# Patient Record
Sex: FEMALE | Race: ASIAN | NOT HISPANIC OR LATINO | Employment: UNEMPLOYED | URBAN - METROPOLITAN AREA
[De-identification: names, ages, dates, MRNs, and addresses within clinical notes are randomized per-mention and may not be internally consistent; named-entity substitution may affect disease eponyms.]

---
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
## 2025-02-06 ENCOUNTER — ALLIED HEALTH/NURSE VISIT (OUTPATIENT)
Dept: FAMILY MEDICINE | Facility: CLINIC | Age: 20
End: 2025-02-06
Payer: MEDICAID

## 2025-02-06 DIAGNOSIS — Z23 ENCOUNTER FOR IMMUNIZATION: Primary | ICD-10-CM

## 2025-02-06 NOTE — PROGRESS NOTES
Prior to immunization administration, verified patients identity using patient s name and date of birth. Please see Immunization Activity for additional information.     Is the patient's temperature normal (100.5 or less)? Yes     Patient MEETS CRITERIA. PROCEED with vaccine administration.      Patient instructed to remain in clinic for 15 minutes afterwards, and to report any adverse reactions.      Link to Ancillary Visit Immunization Standing Orders SmartSet     Screening performed by Camila Christopher MA on 2/6/2025 at 10:19 AM.

## 2025-02-16 ENCOUNTER — HEALTH MAINTENANCE LETTER (OUTPATIENT)
Age: 20
End: 2025-02-16

## 2025-06-23 ENCOUNTER — VIRTUAL VISIT (OUTPATIENT)
Dept: OBGYN | Facility: CLINIC | Age: 20
End: 2025-06-23
Payer: MEDICAID

## 2025-06-23 DIAGNOSIS — Z34.80 PRENATAL CARE, SUBSEQUENT PREGNANCY, UNSPECIFIED TRIMESTER: Primary | ICD-10-CM

## 2025-06-23 PROBLEM — R93.89 ABNORMAL FINDING ON ULTRASOUND: Status: ACTIVE | Noted: 2024-11-20

## 2025-06-23 PROBLEM — O36.8990 UMBILICAL CORD CYST DURING PREGNANCY, ANTEPARTUM: Status: ACTIVE | Noted: 2024-11-12

## 2025-06-23 PROBLEM — O28.0 ABNORMAL MATERNAL SERUM SCREENING TEST: Status: ACTIVE | Noted: 2024-11-20

## 2025-06-23 PROBLEM — O09.619: Status: ACTIVE | Noted: 2024-11-12

## 2025-06-23 PROCEDURE — 99207 PR NO CHARGE NURSE ONLY: CPT | Mod: 93

## 2025-06-23 RX ORDER — NITROFURANTOIN 25; 75 MG/1; MG/1
CAPSULE ORAL
COMMUNITY
Start: 2024-11-15 | End: 2025-06-26

## 2025-06-23 RX ORDER — PRENATAL VIT 27,CALC/IRON/FA 60 MG-1 MG
1 TABLET ORAL
COMMUNITY
Start: 2024-09-19 | End: 2025-09-19

## 2025-06-23 NOTE — PATIENT INSTRUCTIONS
Learning About Pregnancy  Your Care Instructions     Your health in the early weeks of your pregnancy is particularly important for your baby's health. Take good care of yourself. Anything you do that harms your body can also harm your baby.  Make sure to go to all of your doctor appointments. Regular checkups will help keep you and your baby healthy.  How can you care for yourself at home?  Diet    Choose healthy foods like fruits, vegetables, whole grains, lean proteins, and healthy fats.     Choose foods that are good sources of calcium, iron, and folate. You can try dairy products, dark leafy greens, fortified orange juice and cereals, almonds, broccoli, dried fruit, and beans.     Do not skip meals or go for many hours without eating. If you are nauseated, try to eat a small, healthy snack every 2 to 3 hours.     Avoid fish that are high in mercury. These include shark, swordfish, adonay mackerel, marlin, orange roughy, and bigeye tuna, as well as tilefish from the [facility].     It's okay to eat up to 8 to 12 ounces a week of fish that are low in mercury or up to 4 ounces a week of fish that have medium levels of mercury. Some fish that are low in mercury are salmon, shrimp, canned light tuna, cod, and tilapia. Some fish that have medium levels of mercury are halibut and white albacore tuna.     Drink plenty of fluids. If you have kidney, heart, or liver disease and have to limit fluids, talk with your doctor before you increase the amount of fluids you drink.     Limit caffeine to about 200 to 300 mg per day. On average, a cup of brewed coffee has around 80 to 100 mg of caffeine.     Do not drink alcohol, such as beer, wine, or hard liquor.     Take a multivitamin that contains at least 400 micrograms (mcg) of folic acid to help prevent birth defects. Fortified cereal and whole wheat bread are good additional sources of folic acid.     Increase the calcium in your diet. Try to drink a quart of skim milk  each day. You may also take calcium supplements and choose foods such as cheese and yogurt.   Lifestyle    Make sure you go to your follow-up appointments.     Get plenty of rest. You may be unusually tired while you are pregnant.     Get at least 30 minutes of exercise on most days of the week. Walking is a good choice. If you have not exercised in the past, start out slowly. Take several short walks each day.     Do not smoke. If you need help quitting, talk to your doctor about stop-smoking programs. These can increase your chances of quitting for good.     Do not touch cat feces or litter boxes. Also, wash your hands after you handle raw meat, and fully cook all meat before you eat it. Wear gloves when you work in the yard or garden, and wash your hands well when you are done. Cat feces, raw or undercooked meat, and contaminated dirt can cause an infection that may harm your baby or lead to a miscarriage.     Avoid things that can make your body too hot and may be harmful to your baby, such as a hot tub or sauna. Or talk with your doctor before doing anything that raises your body temperature. Your doctor can tell you if it's safe.     Avoid chemical fumes, paint fumes, or poisons.     Do not use illegal drugs, marijuana, or alcohol.   Medicines    Review all of your medicines with your doctor. Some of your routine medicines may need to be changed to protect your baby.     Use acetaminophen (Tylenol) to relieve minor problems, such as a mild headache or backache or a mild fever with cold symptoms. Do not use nonsteroidal anti-inflammatory drugs (NSAIDs), such as ibuprofen (Advil, Motrin) or naproxen (Aleve), unless your doctor says it is okay.     Do not take two or more pain medicines at the same time unless the doctor told you to. Many pain medicines have acetaminophen, which is Tylenol. Too much acetaminophen (Tylenol) can be harmful.     Take your medicines exactly as prescribed. Call your doctor if you  "think you are having a problem with your medicine.   To manage morning sickness    Keep food in your stomach, but not too much at once. Try eating five or six small meals a day instead of three large meals.     For nausea when you wake up, eat a small snack, such as a couple of crackers or pretzels, before rising. Allow a few minutes for your stomach to settle before you slowly get up.     Try to avoid smells and foods that make you feel nauseated. High-fat or greasy foods, milk, and coffee may make nausea worse. Some foods that may be easier to tolerate include cold, spicy, sour, and salty foods.     Drink enough fluids. Water and other caffeine-free drinks are good choices.     Take your prenatal vitamins at night on a full stomach.     Try foods and drinks made with xochilt. Xochilt may help with nausea.     Get lots of rest. Morning sickness may be worse when you are tired.     Talk to your doctor about over-the-counter products, such as vitamin B6 or doxylamine, to help relieve symptoms.     Try a P6 acupressure wrist band. These anti-nausea wristbands help some people.   Follow-up care is a key part of your treatment and safety. Be sure to make and go to all appointments, and call your doctor if you are having problems. It's also a good idea to know your test results and keep a list of the medicines you take.  Where can you learn more?  Go to https://www.POW.net/patiented  Enter E868 in the search box to learn more about \"Learning About Pregnancy.\"  Current as of: April 30, 2024  Content Version: 14.5    4056-2601 [employer].   Care instructions adapted under license by your healthcare professional. If you have questions about a medical condition or this instruction, always ask your healthcare professional. [employer] disclaims any warranty or liability for your use of this information.    Weeks 6 to 10 of Your Pregnancy: Care Instructions  During these weeks of pregnancy, your " "body goes through many changes. You may start to feel different, both in your body and your emotions. Each pregnancy is different, so there's no \"right\" way to feel. These early weeks are a time to make healthy choices for you and your pregnancy.    Take a daily prenatal vitamin. Choose one with folic acid in it.   Avoid alcohol, tobacco, and drugs (including marijuana). If you need help quitting, talk to your doctor.     Drink plenty of liquids.  Be sure to drink enough water. And limit sodas, other sweetened drinks, and caffeine.     Choose foods that are good sources of calcium, iron, and folate.  You can try dairy products, dark leafy greens, fortified orange juice and cereals, almonds, broccoli, dried fruit, and beans.     Avoid foods that may be harmful.  Don't eat raw meat, deli meat, raw seafood, or raw eggs. Avoid soft cheese and unpasteurized dairy, like Brie and blue cheese. And don't eat fish that contains a lot of mercury, like shark and swordfish.     Don't touch shilo litter or cat poop.  They can cause an infection that could be harmful during pregnancy.     Avoid things that can make your body too hot.  For example, avoid hot tubs and saunas.     Soothe morning sickness.  Try eating 5 or 6 small meals a day, getting some fresh air, or using pam to control symptoms.     Ask your doctor about flu and COVID-19 shots.  Getting them can help protect against infection.   Follow-up care is a key part of your treatment and safety. Be sure to make and go to all appointments, and call your doctor if you are having problems. It's also a good idea to know your test results and keep a list of the medicines you take.  Where can you learn more?  Go to https://www.[email or web address]/[email or web address]  Enter G112 in the search box to learn more about \"Weeks 6 to 10 of Your Pregnancy: Care Instructions.\"  Current as of: April 30, 2024  Content Version: 14.5    5297-4015 [facility] [employer].   Care instructions adapted " under license by your healthcare professional. If you have questions about a medical condition or this instruction, always ask your healthcare professional. [employer] disclaims any warranty or liability for your use of this information.       Pregnancy: Managing Morning Sickness (01:48)  Your health professional recommends that you watch this short online health video.  Learn how to manage morning sickness during pregnancy.   Purpose: Learn how to manage morning sickness during pregnancy.  Goal: Learn how to manage morning sickness during pregnancy.    Watch: Scan the QR code or visit the link to view video       https://hwi.[email or web address]  Current as of: April 30, 2024  Content Version: 14.5    1395-4811 [employer]   Care instructions adapted under license by your healthcare professional. If you have questions about a medical condition or this instruction, always ask your healthcare professional. [employer] disclaims any warranty or liability for your use of this information.    Pregnancy and Heartburn: Care Instructions  Overview     Heartburn is a common problem during pregnancy.  Heartburn happens when stomach acid backs up into the tube that carries food to the stomach. This tube is called the esophagus. Early in pregnancy, heartburn is caused by hormone changes that slow down digestion. Later on, it's also caused by the large uterus pushing up on the stomach.  Even though you can't fix the cause, there are things you can do to get relief. Treating heartburn during pregnancy focuses first on making lifestyle changes, like changing what and how you eat, and on taking medicines.  Heartburn usually improves or goes away after childbirth.  Follow-up care is a key part of your treatment and safety. Be sure to make and go to all appointments, and call your doctor if you are having problems. It's also a good idea to know your test results and keep a list of the medicines you  "take.  How can you care for yourself at home?  Eat small, frequent meals.  Avoid foods that make your symptoms worse, such as chocolate, peppermint, and spicy foods. Avoid drinks with caffeine, such as coffee, tea, and sodas.  Avoid bending over or lying down after meals.  Take a short walk after you eat.  If heartburn is a problem at night, do not eat for 2 hours before bedtime.  Take antacids like Mylanta, Maalox, Rolaids, or Tums. Do not take antacids that have sodium bicarbonate, magnesium trisilicate, or aspirin. Be careful when you take over-the-counter antacid medicines. Many of these medicines have aspirin in them. While you are pregnant, do not take aspirin or medicines that contain aspirin unless your doctor says it is okay.  If you're not getting relief, talk to your doctor. You may be able to take a stronger acid-reducing medicine.  When should you call for help?   Call your doctor now or seek immediate medical care if:    You have new or worse belly pain.     You are vomiting.   Watch closely for changes in your health, and be sure to contact your doctor if:    You have new or worse symptoms of reflux.     You are losing weight.     You have trouble or pain swallowing.     You do not get better as expected.   Where can you learn more?  Go to https://www.[employer].net/patiented  Enter U946 in the search box to learn more about \"Pregnancy and Heartburn: Care Instructions.\"  Current as of: April 30, 2024  Content Version: 14.5 2024-2025 [employer].   Care instructions adapted under license by your healthcare professional. If you have questions about a medical condition or this instruction, always ask your healthcare professional. [employer] disclaims any warranty or liability for your use of this information.    Constipation: Care Instructions  Overview     Constipation means that you have a hard time passing stools (bowel movements). People pass stools from 3 times a day to " once every 3 days. What is normal for you may be different. Constipation may occur with pain in the rectum and cramping. The pain may get worse when you try to pass stools. Sometimes there are small amounts of bright red blood on toilet paper or the surface of stools. This is because of enlarged veins near the rectum (hemorrhoids).  A few changes in your diet and lifestyle may help you avoid ongoing constipation. Your doctor may also prescribe medicine to help loosen your stool.  Some medicines can cause constipation. These include pain medicines and antidepressants. Tell your doctor about all the medicines you take. Your doctor may want to make a medicine change to ease your symptoms.  Follow-up care is a key part of your treatment and safety. Be sure to make and go to all appointments, and call your doctor if you are having problems. It's also a good idea to know your test results and keep a list of the medicines you take.  How can you care for yourself at home?  Drink plenty of fluids. If you have kidney, heart, or liver disease and have to limit fluids, talk with your doctor before you increase the amount of fluids you drink.  Include high-fiber foods in your diet each day. These include fruits, vegetables, beans, and whole grains.  Get at least 30 minutes of exercise on most days of the week. Walking is a good choice. You also may want to do other activities, such as running, swimming, cycling, or playing tennis or team sports.  Take a fiber supplement, such as Citrucel or Metamucil, every day. Read and follow all instructions on the label.  Schedule time each day for a bowel movement. A daily routine may help. Take your time having a bowel movement, but don't sit for more than 10 minutes at a time. And don't strain too much.  Support your feet with a small step stool when you sit on the toilet. This helps flex your hips and places your pelvis in a squatting position.  Your doctor may recommend an  "over-the-counter laxative to relieve your constipation. Examples are Milk of Magnesia and MiraLax. Read and follow all instructions on the label. Do not use laxatives on a long-term basis.  When should you call for help?   Call your doctor now or seek immediate medical care if:    You have new or worse belly pain.     You have new or worse nausea or vomiting.     You have blood in your stools.   Watch closely for changes in your health, and be sure to contact your doctor if:    Your constipation is getting worse.     You do not get better as expected.   Where can you learn more?  Go to https://www.[email or web address]/patiented  Enter P343 in the search box to learn more about \"Constipation: Care Instructions.\"  Current as of: October 19, 2024  Content Version: 14.5 2024-2025 [employer].   Care instructions adapted under license by your healthcare professional. If you have questions about a medical condition or this instruction, always ask your healthcare professional. [employer] disclaims any warranty or liability for your use of this information.    Learning About High-Iron Foods  What foods are high in iron?     The foods you eat contain nutrients, such as vitamins and minerals. Iron is a nutrient. Your body needs the right amount to stay healthy and work as it should. You can use the list below to help you make choices about which foods to eat.  Here are some foods that contain iron. They have 1 to 2 milligrams of iron per serving.  Fruits  Figs (dried), 5 figs  Vegetables  Asparagus (canned), 6 morales  Mona, beet, Swiss chard, or turnip greens, 1 cup  Dried peas, cooked,   cup  Seaweed, spirulina (dried),   cup  Spinach, (cooked)   cup or (raw) 1 cup  Grains  Cereals, fortified with iron, 1 cup  Grits (instant, cooked), fortified with iron,   cup  Meats and other protein foods  Beans (kidney, lima, navy, white), canned or cooked,   cup  Beef or lamb, 3 oz  Chicken giblets, 3 " "oz  Chickpeas (garbanzo beans),   cup  Liver of beef, lamb, or pork, 3 oz  Oysters (cooked), 3 oz  Sardines (canned), 3 oz  Soybeans (boiled),   cup  Tofu (firm),   cup  Work with your doctor to find out how much of this nutrient you need. Depending on your health, you may need more or less of it in your diet.  Where can you learn more?  Go to https://www.[employer].net/patiented  Enter R005 in the search box to learn more about \"Learning About High-Iron Foods.\"  Current as of: October 7, 2024  Content Version: 14.5 2024-2025 [employer].   Care instructions adapted under license by your healthcare professional. If you have questions about a medical condition or this instruction, always ask your healthcare professional. [employer] disclaims any warranty or liability for your use of this information.    Rh Antibodies Screening During Pregnancy: About This Test  What is it?     The Rh antibodies screening test is a blood test. It checks your blood for Rh antibodies. If you have Rh-negative blood and have been exposed to Rh-positive blood, your immune system may make antibodies to attack the Rh-positive blood. When a pregnant woman has these antibodies, it is called Rh sensitization.  Why is this test done?  The Rh antibodies screening test is done during pregnancy to find out if your baby is at risk for Rh disease. This can happen if you have Rh-negative blood and your baby has Rh-positive blood. If your Rh-negative blood mixes with Rh-positive blood, your immune system will make antibodies to attack the Rh-positive blood.  During pregnancy, these antibodies could attach to the baby's red blood cells. This can cause your baby to have serious health problems. The results of this test will help your doctor know how to best care for you and your baby during your pregnancy.  How do you prepare for the test?  In general, there's nothing you have to do before this test, unless your doctor tells you " "to.  How is the test done?  A health professional uses a needle to take a blood sample, usually from the arm.  What happens after the test?  You will probably be able to go home right away. It depends on the reason for the test.  You can go back to your usual activities right away.  Follow-up care is a key part of your treatment and safety. Be sure to make and go to all appointments, and call your doctor if you are having problems. It's also a good idea to keep a list of the medicines you take. Ask your doctor when you can expect to have your test results.  Where can you learn more?  Go to https://[email or web address].net/[email or web address]  Enter P722 in the search box to learn more about \"Rh Antibodies Screening During Pregnancy: About This Test.\"  Current as of: 2024  Content Version: 14.5    9179-5903 [employer].   Care instructions adapted under license by your healthcare professional. If you have questions about a medical condition or this instruction, always ask your healthcare professional. [employer] disclaims any warranty or liability for your use of this information.    Learning About Preventing Rh Disease  What is Rh disease?    Rh disease can be a serious problem in pregnancy. It happens when substances called antibodies in the mother's blood cause red blood cells in her baby's blood to be destroyed. This can occur when the blood types of a mother and her baby do not match.  All blood has an Rh factor. This is what makes a blood type positive or negative. When you are Rh-negative, your baby may be Rh-negative or Rh-positive. If your baby has Rh-positive blood and it mixes with yours, your body will make antibodies. This is called Rh sensitization.  Most of the time, this is not a problem in a first pregnancy. But in future pregnancies, it could cause Rh disease.  A  with Rh disease has mild anemia and may have jaundice. In severe cases, anemia, jaundice, and swelling can be " "very dangerous or fatal. Some babies need to be delivered early. Some need special care in the NICU. A very sick baby will need a blood transfusion before or after birth.  Fortunately, Rh sensitization is usually easy to prevent.  That's why it's important to get your Rh status checked in your first trimester. It doesn't cause any warning signs. A blood test is the only way to know if you are Rh-sensitive or are at risk for it.  How can you prevent Rh disease?  If you are Rh-negative, an Rh immune globulin shot (such as RhoGAM) can help prevent your body from making the antibodies that attack your baby's red blood cells.  Timing is important. The shot is given at certain times during your pregnancy. And it is given anytime there is a chance that your baby's blood might mix with yours. That can happen with certain prenatal tests or when you have pregnancy bleeding, such as:  Right after any pregnancy loss, amniocentesis, or CVS testing.  After turning of a breech baby.  Before and maybe after childbirth. If you need the shot, it is given around week 28. If your  is Rh-positive, you will have another shot after birth.  Follow-up care is a key part of your treatment and safety. Be sure to make and go to all appointments, and call your doctor if you are having problems. It's also a good idea to know your test results and keep a list of the medicines you take.  Where can you learn more?  Go to https://www.[employer].net/patiented  Enter W177 in the search box to learn more about \"Learning About Preventing Rh Disease.\"  Current as of: 2024  Content Version: 14.5    0215-6894 [employer].   Care instructions adapted under license by your healthcare professional. If you have questions about a medical condition or this instruction, always ask your healthcare professional. [employer] disclaims any warranty or liability for your use of this information.    Learning About Rh " Immunoglobulin Shots  Introduction    An Rh immunoglobulin shot is given during pregnancy to prevent Rh sensitization. Rh sensitization can happen if you have Rh-negative blood and your baby has Rh-positive blood. If the two types of blood mix, your body will make antibodies against your baby's blood. Most of the time, this is not a problem the first time you're pregnant. But it could cause problems in future pregnancies.  This shot keeps your body from making the antibodies. If you need the shot, it is given around 28 weeks of pregnancy. After the birth, your baby's blood is tested. If the blood is Rh positive, you will receive another shot. The shot may also be given if you have vaginal bleeding while you are pregnant or if you have a miscarriage. These shots protect future pregnancies.  Example  Rh immunoglobulin (HypRho-D, MICRhoGAM, and RhoGAM)  Possible side effects  Rare side effects may include:  Some mild pain where you got the shot.  A slight fever.  An allergic reaction.  You may have other side effects not listed here. Check the information that comes with your medicine.  What to know about taking this medicine  You may need more than one shot. You may need the shot again:  After amniocentesis, fetal blood sampling, or chorionic villus sampling tests.  If you have bleeding in your second or third trimester.  After turning of a breech baby.  After an injury to the belly while you are pregnant.  After a miscarriage or an .  Before or right after treatment for an ectopic or a partial molar pregnancy.  Tell your doctor if you have any allergies or have had a bad response to medicines in the past.  If you get this shot within 3 months of getting a live-virus vaccine, the vaccine may not work. Your doctor will tell you if you need more vaccine.  Check with your doctor or pharmacist before you use any other medicines. This includes over-the-counter medicines. Make sure your doctor knows all of the  "medicines, vitamins, herbs, and supplements you take. Taking some medicines at the same time can cause problems.  Where can you learn more?  Go to https://www.[employer].net/patiented  Enter V615 in the search box to learn more about \"Learning About Rh Immunoglobulin Shots.\"  Current as of: April 30, 2024  Content Version: 14.5 2024-2025 [employer].   Care instructions adapted under license by your healthcare professional. If you have questions about a medical condition or this instruction, always ask your healthcare professional. [employer] disclaims any warranty or liability for your use of this information.    Rubella (Greek Measles): Care Instructions  Overview  Rubella, also called Greek measles or 3-day measles, is a disease caused by a virus. It spreads by coughs, sneezes, and close contact. Rubella usually is mild and does not cause long-term problems. But if you are pregnant and get it, you can give the disease to your unborn baby. This can cause serious birth defects.  While you have rubella, you may get a rash and a mild fever, and the lymph glands in your neck may swell. Older children often have a fever, eye pain, a sore throat, and body aches. You can relieve most symptoms with care at home. Avoid being around others, especially pregnant people, until your rash has been gone for at least 4 days. People who have not had this disease before or have not had the vaccine have the greatest chance of getting the virus.  Follow-up care is a key part of your treatment and safety. Be sure to make and go to all appointments, and call your doctor if you are having problems. It's also a good idea to know your test results and keep a list of the medicines you take.  How can you care for yourself at home?  Drink plenty of fluids. If you have kidney, heart, or liver disease and have to limit fluids, talk with your doctor before you increase the amount of fluids you drink.  Get plenty of " "rest to help your body heal.  Take an over-the-counter pain medicine, such as acetaminophen (Tylenol), ibuprofen (Advil, Motrin), or naproxen (Aleve), to reduce fever and discomfort. Read and follow all instructions on the label. Do not give aspirin to anyone younger than 20. It has been linked to Reye syndrome, a serious illness.  Do not take two or more pain medicines at the same time unless the doctor told you to. Many pain medicines have acetaminophen, which is Tylenol. Too much acetaminophen (Tylenol) can be harmful.  Try not to scratch the rash. Put cold, wet cloths on the rash to reduce itching.  Do not smoke. Smoking can make your symptoms worse. If you need help quitting, talk to your doctor about stop-smoking programs and medicines. These can increase your chances of quitting for good.  Avoid contact with people who have never had rubella and who have not been immunized.  When should you call for help?   Call your doctor now or seek immediate medical care if:    You have a fever with a stiff neck or a severe headache.     You are sensitive to light or feel very sleepy or confused.   Watch closely for changes in your health, and be sure to contact your doctor if:    You do not get better as expected.   Where can you learn more?  Go to https://www.Bunch.net/patiented  Enter B812 in the search box to learn more about \"Rubella ([place] Measles): Care Instructions.\"  Current as of: April 30, 2024  Content Version: 14.5 2024-2025 [employer].   Care instructions adapted under license by your healthcare professional. If you have questions about a medical condition or this instruction, always ask your healthcare professional. [employer] disclaims any warranty or liability for your use of this information.    Gonorrhea and Chlamydia: About These Tests  What is it?  These tests use a sample of urine or other body fluid to look for the bacteria that cause these sexually transmitted " "infections (STIs). The fluid sample can come from the cervix, vagina, rectum, throat, or eyes.  Why is this test done?  These tests may be done to:  Find out if symptoms are caused by gonorrhea or chlamydia.  Check people who are at high risk of being infected with gonorrhea or chlamydia.  Retest people several months after they have been treated for gonorrhea or chlamydia.  Check for infection in your  if you had a gonorrhea or chlamydia infection at the time of delivery.  How can you prepare for the test?  If you are going to have a urine test, do not urinate for at least 1 hour before the test.  If you think you may have chlamydia or gonorrhea, don't have sexual intercourse until you get your test results. And you may want to have tests for other STIs, such as HIV.  How is the test done?  For a direct sample, a swab is used to collect body fluid from the cervix, vagina, rectum, throat, or eyes. Your doctor may collect the sample. Or you may be given instructions on how to collect your own sample.  For a urine sample, you will collect the urine that comes out when you first start to urinate. Don't wipe the genital area clean before you urinate.  How long does the test take?  The test will take a few minutes.  What happens after the test?  You will be able to go home right away.  You can go back to your usual activities right away.  If you do have an infection, don't have sexual intercourse for 7 days after you start treatment. And your sex partner(s) should also be treated.  Follow-up care is a key part of your treatment and safety. Be sure to make and go to all appointments, and call your doctor if you are having problems. It's also a good idea to keep a list of the medicines you take. Ask your doctor when you can expect to have your test results.  Where can you learn more?  Go to https://www.healthwise.net/patiented  Enter K976 in the search box to learn more about \"Gonorrhea and Chlamydia: About These " "Tests.\"  Current as of: April 30, 2024  Content Version: 14.5    1400-9471 [employer].   Care instructions adapted under license by your healthcare professional. If you have questions about a medical condition or this instruction, always ask your healthcare professional. [employer] disclaims any warranty or liability for your use of this information.    Trichomoniasis: About This Test  What is it?     This test uses a sample of urine or other body fluid to look for the tiny parasite that causes trichomoniasis (also called trich). The fluid sample can come from the vagina, cervix, or urethra. Your doctor may choose to use one or more of many available tests.  Why is it done?  A trich test may be done to:  Find out if symptoms are caused by trich.  Check people who are at high risk for being infected with trich.  Check after treatment to make sure that the infection is gone.  How do you prepare for the test?  If you are going to have a urine test, do not urinate for at least 1 hour before the test.  How is the test done?  For a direct sample, a swab is used to collect body fluid from the cervix, vagina, or urethra. Your doctor may collect the sample. Or you may be given instructions on how to collect your own sample.  For a urine sample, you will collect the urine that comes out when you first start to urinate. Don't wipe the area clean before you urinate.  How long does the test take?  It will take a few minutes to collect a sample.  What happens after the test?  You can go home right away.  You can go back to your usual activities right away.  You may get the test results the same day or several days later. It depends on the test used.  If you do have an infection, don't have sexual intercourse for 7 days after you start treatment. Your sex partner or partners should also be treated.  Follow-up care is a key part of your treatment and safety. Be sure to make and go to all appointments, and " call your doctor if you are having problems. Ask your doctor when you can expect to have your test results.  Current as of: April 30, 2024  Content Version: 14.5    0940-5243 [employer].   Care instructions adapted under license by your healthcare professional. If you have questions about a medical condition or this instruction, always ask your healthcare professional. [employer] disclaims any warranty or liability for your use of this information.    HIV Testing: Care Instructions  Overview  You can get tested for the human immunodeficiency virus (HIV). Most doctors use a blood test to check for HIV antibodies and antigens in your blood. It may also check for the genetic material (RNA) of HIV. Some tests use saliva to check for HIV antibodies. But these aren't as accurate. For example, they may give a false result if you've just been infected.  What do the results mean?        Normal (negative)   No HIV antibodies, antigens, or RNA were found.  You may need more testing. It can make sure your test results are correct.        Uncertain (indeterminate)   Test results didn't clearly show if you have an HIV infection.  HIV antibodies or antigens may not have formed yet.  Some other type of antibody or antigen may have affected the results.  You will need another test to be sure.        Abnormal (positive)   HIV antibodies, antigens, or RNA were found.  If you haven't had an RNA test yet, one will be done. If it's positive, you have HIV.  If your test result is positive, your doctor will talk to you. You will discuss starting treatment.  Follow-up care is a key part of your treatment and safety. Be sure to make and go to all appointments, and call your doctor if you are having problems. It's also a good idea to know your test results and keep a list of the medicines you take.  Where can you learn more?  Go to https://[email or web address].com.net/patiented  Enter T792 in the search box to learn more about  "\"HIV Testing: Care Instructions.\"  Current as of: April 30, 2024  Content Version: 14.5    4343-3047 [employer].   Care instructions adapted under license by your healthcare professional. If you have questions about a medical condition or this instruction, always ask your healthcare professional. [employer] disclaims any warranty or liability for your use of this information.    Hepatitis C Virus Tests: About These Tests  What are they?     Hepatitis C virus tests are blood tests that check for substances in the blood that show whether you have hepatitis C now or had it in the past. The tests can also tell you what type of hepatitis C you have and how severe the disease is. This can help your doctor with treatment.  If the tests show that you have long-term hepatitis C, you need to take steps to prevent spreading the disease.  Why are these tests done?  You may need these tests if:  You have symptoms of hepatitis.  You may have been exposed to the virus. You are more likely to have been exposed to the virus if you inject drugs or are exposed to body fluids (such as if you are a health care worker).  You've had other tests that show you have liver problems.  You are 18 to 79 years old.  You have an HIV infection.  The tests also are done to help your doctor decide about your treatment and see how well it works.  How do you prepare for the test?  In general, there's nothing you have to do before this test, unless your doctor tells you to.  How is the test done?  A health professional uses a needle to take a blood sample, usually from the arm.  What happens after these tests?  You will probably be able to go home right away.  You can go back to your usual activities right away.  Follow-up care is a key part of your treatment and safety. Be sure to make and go to all appointments, and call your doctor if you are having problems. It's also a good idea to keep a list of the medicines you take. Ask " "your doctor when you can expect to have your test results.  Where can you learn more?  Go to https://[email or web address].com.net/patiented  Enter W551 in the search box to learn more about \"Hepatitis C Virus Tests: About These Tests.\"  Current as of: April 30, 2024  Content Version: 14.5    3189-4262 [employer].   Care instructions adapted under license by your healthcare professional. If you have questions about a medical condition or this instruction, always ask your healthcare professional. [employer] disclaims any warranty or liability for your use of this information.    Learning About Fetal Ultrasound Results  What is a fetal ultrasound?     Fetal ultrasound is a test that lets your doctor see an image of your baby. Your doctor learns information about your baby from this picture. You may find out, for example, if you are having a boy or a girl. But the main reason you have this test is to get information about your baby's health.  (You may hear your baby called a fetus. This is a common medical term for a baby that's growing in the mother's uterus.)  What kind of information can you learn from this test?  The findings of an ultrasound fall into two categories, normal and abnormal.  Normal  The fetus is the right size for its age.  The placenta is the expected size and does not cover the cervix.  There is enough amniotic fluid in the uterus.  No birth defects can be seen.  Abnormal  The fetus is small or large for its age.  The placenta covers the cervix.  There is too much or too little amniotic fluid in the uterus.  The fetus may have a birth defect.  What does an abnormal result mean?  Abnormal seems to imply that something is wrong with your baby. But what it means is that the test has shown something the doctor wants to take a closer look at.  And that's what happens next. Your doctor will talk to you about what further test or tests you may need.  What do the results mean?  Some of the " things your doctor may see on an abnormal ultrasound include:  Echogenic bowel.  The bowel looks very bright on the screen. This could mean that there's blood in the bowel. Or it could mean that something is blocking the small bowel.  Increased nuchal translucency.  The ultrasound measures the thickness at the back of the baby's neck. An increase in thickness is sometimes an early sign of Down syndrome.  Increased or decreased amniotic fluid.  The doctor will look for a reason for the level of amniotic fluid and will watch the pregnancy closely as it progresses.  Large ventricles.  Ventricles in the brain look larger than they should. Your doctor may take a closer look at the brain.  Renal pyelectasis/hydronephrosis.  The ultrasound measures the fluid around the kidney. If there is more fluid than expected, there is a chance of urinary tract or kidney problems.  Short long bones.  The ultrasound measures certain arm and leg bones. A long bone (humerus or femur) that is shorter than average could be a sign of Down syndrome.  Subchorionic hemorrhage.  An ultrasound can show bleeding under one of the membranes that surrounds the fetus. Some women don't have symptoms of bleeding. The ultrasound can find this problem when women are not bleeding from their vagina. Women who have this condition have a slightly higher chance of miscarriage.  What do you do now?  Take a deep breath, and let it out. Keep in mind that an abnormal finding on an ultrasound, after it's coupled with more information, may:  Turn out to be nothing.  Turn out to be something mild that won't affect the baby.  Turn out to be something more serious. But if this happens, early diagnosis helps you and your doctor plan treatment options sooner rather than later.  Your medical team is there for you. So are your family and friends. Ask questions, and get the help and support you need.  Follow-up care is a key part of your treatment and safety. Be sure to  "make and go to all appointments, and call your doctor if you are having problems. It's also a good idea to know your test results and keep a list of the medicines you take.  Where can you learn more?  Go to https://www.Graphdive.net/patiented  Enter K451 in the search box to learn more about \"Learning About Fetal Ultrasound Results.\"  Current as of: April 30, 2024  Content Version: 14.5    5129-0932 [employer].   Care instructions adapted under license by your healthcare professional. If you have questions about a medical condition or this instruction, always ask your healthcare professional. [employer] disclaims any warranty or liability for your use of this information.    Learning About Prenatal Visits  Overview     Regular prenatal visits are very important during any pregnancy. These quick office visits may seem simple and routine. But they can help you have a safe and healthy pregnancy. Your doctor is watching for problems that can only be found through regular checkups. The visits also give you and your doctor time to build a good relationship.  After your first visit, you will most likely start on a schedule of monthly visits. In your third trimester, the visits will get more frequent. Based on your health, your age, and if you've had a normal, full-term pregnancy before, your doctor may want to see you more or less often.  At different times in your pregnancy, you will have exams and tests. Some are routine. Others are done only when there is a chance of a problem. Everything healthy you do for your body helps you have a healthy pregnancy. Rest when you need it. Eat well, drink plenty of water, and exercise regularly.  What happens during a prenatal visit?  You will have blood pressure checks, along with urine tests. You also may have blood tests. If you need to go to the bathroom while waiting for the doctor, tell the nurse. You will be given a sample cup so your urine can be " tested.  You will be weighed and have your belly measured.  Your doctor may listen to the fetal heartbeat with a special device.  At about 24 weeks, and possibly earlier in your pregnancy, your doctor will check your blood sugar (glucose tolerance test) for diabetes that can occur during pregnancy. This is gestational diabetes, which can be harmful.  You will have tests to check for infections that could harm your . These include group B streptococcus and hepatitis B.  Your doctor may do ultrasounds to check for problems. This also checks the position of the fetus. An ultrasound uses sound waves to produce a picture of the fetus.  You may get your vaccines updated.  Your doctor may ask you questions to check for signs of anxiety or depression. Tell your doctor if you feel sad, anxious, or hopeless for more than a few days.  You may have other tests at any time during your pregnancy.  Use your visits to discuss with your doctor any concerns you have.  How can you care for yourself at home?  Get plenty of rest.  Try to exercise every day, if your doctor says it is okay. If you have not exercised in the past, start out slowly. For example, you can take short walks each day.  Choose healthy foods, such as fruits, vegetables, whole grains, lean proteins, low-fat dairy, and healthy fats.  Drink plenty of fluids. Cut down on drinks with caffeine, such as coffee, tea, and cola. If you have kidney, heart, or liver disease and have to limit fluids, talk with your doctor before you increase the amount of fluids you drink.  Try to avoid chemical fumes, paint fumes, and poisons.  If you smoke, vape, or use alcohol, marijuana, or other drugs, quit or cut back as much as you can. Talk to your doctor if you need help quitting.  Review all of your medicines, including over-the-counter medicines and supplements, with your doctor. Some of your routine medicines may need to be changed. Do not stop or start taking any medicines  "without talking to your doctor first.  Follow-up care is a key part of your treatment and safety. Be sure to make and go to all appointments, and call your doctor if you are having problems. It's also a good idea to know your test results and keep a list of the medicines you take.  Where can you learn more?  Go to https://www.[email or web address].net/patiented  Enter J502 in the search box to learn more about \"Learning About Prenatal Visits.\"  Current as of: April 30, 2024  Content Version: 14.5    1469-2809 [employer].   Care instructions adapted under license by your healthcare professional. If you have questions about a medical condition or this instruction, always ask your healthcare professional. [employer] disclaims any warranty or liability for your use of this information.    Intimate Partner Violence: Care Instructions  Overview     If you want to save this information but don't think it is safe to take it home, see if a trusted friend can keep it for you. Plan ahead. Know who you can call for help, and memorize the phone number.   Be careful online too. Your online activity may be seen by others. Do not use your personal computer or device to read about this topic. Use a safe computer, such as one at work, a friend's home, or a library.    Intimate partner violence--a type of domestic abuse--is different from an argument now and then. It is a pattern of abuse that one person may use to control another person's behavior. It may start with threats and name-calling. Then, it may lead to more serious acts, like pushing and slapping. The abuse also may occur in other areas. For example, the abuser may withhold money or spend a partner's money without their knowledge.  Abuse can cause serious harm. You are more likely to have a long-term health problem from the injuries and stress of living in a violent relationship. People who are sexually abused by their partners have more sexually transmitted " "infections and unplanned pregnancies. Anyone who is abused also faces emotional pain. Anyone can be abused in relationships. In some relationships, both people use abusive behavior.  If you are pregnant, abuse can cause problems such as poor weight gain, infections, and bleeding. Abuse during this time may increase your baby's risk of low birth weight, premature birth, and death.  Follow-up care is a key part of your treatment and safety. Be sure to make and go to all appointments, and call your doctor if you are having problems. It's also a good idea to know your test results and keep a list of the medicines you take.  How can you care for yourself at home?  If you do not have a safe place to stay, discuss this with your doctor before you leave.  Have a plan for where to go, how to leave your home, and where to stay in case of an emergency. Do not tell your partner about your plan. Contact:  The National Domestic Violence Hotline toll-free at 1-846.880.7696. They can help you find resources in your area.  Your local police department, hospital, or clinic for information about shelters and safe homes near you.  Talk to a trusted friend or neighbor, a counselor, or a wesley leader. Do not feel that you have to hide what happened.  Teach your children how to call for help in an emergency.  Be alert to warning signs, such as threats, heavy alcohol use, or drug use. This can help you avoid danger.  If you can, make sure that there are no guns or other weapons in your home.  When should you call for help?   Call 911 anytime you think you may need emergency care. For example, call if:    You or someone else has just been abused.     You think you or someone else is in danger of being abused.   Watch closely for changes in your health, and be sure to contact your doctor if you have any problems.  Where can you learn more?  Go to https://www.healthwise.net/patiented  Enter G282 in the search box to learn more about \"Intimate " "Partner Violence: Care Instructions.\"  Current as of: July 31, 2024  Content Version: 14.5    3826-2402 [employer].   Care instructions adapted under license by your healthcare professional. If you have questions about a medical condition or this instruction, always ask your healthcare professional. [employer] disclaims any warranty or liability for your use of this information.    Intimate Partner Violence Safety Instructions: Care Instructions  Overview     If you want to save this information but don't think it is safe to take it home, see if a trusted friend can keep it for you. Plan ahead. Know who you can call for help, and memorize the phone number.   Be careful online too. Your online activity may be seen by others. Do not use your personal computer or device to read about this topic. Use a safe computer, such as one at work, a friend's home, or a library.    When you are abused by a spouse or partner, you can take actions to protect yourself and your children.  You can increase your safety whether you decide to stay with your spouse or partner or you decide to leave. You may want to make a safety plan and pack a bag ahead of time. This will help you leave quickly when you decide to. Remember, you cannot change your partner's actions, but you can find help for you and your children. No one deserves to be abused.  Follow-up care is a key part of your treatment and safety. Be sure to make and go to all appointments, and call your doctor if you are having problems. It's also a good idea to know your test results and keep a list of the medicines you take.  How can you care for yourself at home?  Make a plan for your safety   If you decide to stay with your abusive spouse or partner, you can do the following to increase your safety:  Decide what works best to keep you safe in an emergency.  Know who you can call to help you in an emergency.  Decide if you will call the police if you get " hurt again. If you can, agree on a signal with your children or neighbor to call the police for you if you need help. You can flash lights or hang something out of a window.  Choose a safe place to go for a short time if you need to leave home. Memorize the address and phone number.  Learn escape routes out of your home in case you need to leave in a hurry. Teach your children different ways to get out of your home quickly if they need to.  If you can, hide or lock up things that can be used as weapons (guns, knives, hammers).  Learn the number of a domestic violence shelter. Talk to the people there about how they can help.  Find out about other community resources that can help you.  Take pictures of bruises or other injuries if you can. You can also take pictures of things your abuser has broken.  Teach your children that violence is never okay. Tell them that they do not deserve to be hurt.  Pack a bag   Prepare a bag with things you will need if you leave suddenly. Leave it with a friend, a relative, or someone else you trust. You could include the following items in the bag:  A set of keys to your home and car.  Emergency phone numbers and addresses.  Money such as cash or checks. You can also ask a friend, a relative, or someone else you trust to hold money for you.  Copies of legal documents such as house and car titles or rent receipts, birth certificates, Social Security card, voter registration, marriage and 's licenses, and your children's health records.  Personal items you would need for a few days, such as clothes, a toothbrush, toothpaste, and any medicines you or your children need.  A favorite toy or book for your child or children.  Diapers and bottles, if you have very young children.  Pictures that show signs of abuse and violence. You may also add pictures of your abuser.  If you leave   If you decide to leave, you can take the following steps:  Go to the emergency room at a hospital if  "you have been hurt.  Think about asking the police to be with you as you leave. They can protect you as you leave your home.  If you decide to leave secretly, remember that activities can be tracked. Your abuser may still have access to your cell phone, email, and credit cards. It may be possible for these to be traced. Always be aware of your surroundings.  If this is an emergency, do not worry about gathering up anything. Just leave--your safety is most important.  If your abuser moves out, change the locks on the doors. If you have a security system, change the access code.  When should you call for help?   Call 911 anytime you think you may need emergency care. For example, call if:    You or someone else has just been abused.     You think you or someone else is in danger of being abused.   Watch closely for changes in your health, and be sure to contact your doctor if you have any problems.  Where can you learn more?  Go to https://www.[employer].net/patiented  Enter A752 in the search box to learn more about \"Intimate Partner Violence Safety Instructions: Care Instructions.\"  Current as of: July 31, 2024  Content Version: 14.5    8966-0817 [employer].   Care instructions adapted under license by your healthcare professional. If you have questions about a medical condition or this instruction, always ask your healthcare professional. [employer] disclaims any warranty or liability for your use of this information.    Learning About Intimate Partner Violence  What is intimate partner violence?  Intimate partner violence is a type of domestic abuse. It's threatening, emotionally harmful, or violent behavior in a personal relationship. It can happen between past or current partners or spouses. In some relationships both people abuse each other. One partner may be more abusive. Or the abuse may be equal.  Abuse can affect people of any ethnic group, race, or Alevism. It can affect teens, " adults, or the elderly. And it can happen to people of any sexual orientation, gender, or social status.  Abusers use fear, bullying, and threats to control their partners. They may control what their partners do. They may control where their partners go or who they see. They may act jealous, controlling, or possessive. These early signs of abuse may happen soon after the start of the relationship. Sometimes it can be hard to notice abuse at first. But after the relationship becomes more serious, the abuse may get worse.  If you are being abused in your relationship, it's important to get help. The abuse is not your fault. You don't have to face it alone.  Be careful  It may not be safe to take home domestic abuse information like this handout. Some people ask a trusted friend to keep it for them. It's also important to plan ahead and to memorize the phone number of places you can go for help. If you are concerned about your safety, do not use your computer, smartphone, or tablet to read about domestic abuse.   What are the types of intimate partner violence?  Abuse can happen in different ways. Each type can happen on its own or in combination with others.  Emotional abuse  Emotional abuse is a pattern of threats, insults, or controlling behavior. It includes verbal abuse. It goes beyond healthy disagreements in a relationship. It's a sign of an unhealthy relationship.  Do you feel threatened, intimidated, or controlled?  Does your partner:  Threaten your children, other family members, or pets?  Use jokes meant to embarrass or shame you?  Call you names?  Tell you that you are a bad parent?  Threaten to take away your children?  Threaten to have you or your family members deported?  Control your access to money or other basic needs?  Control what you do, who you see or talk to, or where you go?  Another form of emotional abuse is denying that it is happening. Or the abuser may act like the abuse is no big deal or  is your fault.  Sexual abuse  With sexual abuse, abusers may try to convince or force you to have sex. They may force you into sex acts you're not comfortable with. Or they may sexually assault you. Sexual abuse can happen even if you are in a committed relationship.  Physical abuse  Physical abuse means that a partner hits, kicks, or does something else to physically hurt you. Physical abuse that starts with a slap might lead to kicking, shoving, and choking over time. The abuser may also threaten to hurt or kill you.  Stalking  Stalking means that an abuser gives you attention that you do not want and that causes you fear. Examples of stalking include:  Following you.  Showing up at places where the abuser isn't invited, such as at your work or school.  Constantly calling or texting you.  What problems can  to?  Intimate partner violence can be very dangerous. It can cause serious, repeated injury. It can even lead to death.  All forms of abuse can cause long-term health problems from the stress of a violent relationship. Verbal abuse can lead to sexual and physical abuse.  Abuse causes:  Emotional pain.  Depression.  Anxiety.  Post-traumatic stress.  Sexual abuse can lead to sexually transmitted infections (such as HIV/AIDS) and unplanned pregnancy.  Pregnancy can be a very dangerous time for people in abusive relationships. Abuse can cause or increase the risk of problems during pregnancy. These include low weight gain, anemia, infections, and bleeding. Abuse may also increase your baby's risk of low birth weight, premature birth, and death.  It can be hard for some victims of abuse to ask for help or to leave their relationship. You may feel scared, stuck, or not sure what steps to take. But it's important not to ignore abuse. Talking to someone you trust could be the first step to ending the abuse and taking care of your own health and happiness again. There are resources available that can help keep  "you safe.  Where can you get help?  Talk to a trusted friend. Find a local advocacy group, or talk to your doctor about the abuse.  Contact the National Domestic Violence Hotline at 5-368-994-DKHF (1-622.503.2733) for more safety tips. They can guide you to groups in your area that can help. Or go to the National Coalition Against Domestic Violence website at www.[employer].org to learn more.  Domestic violence groups or a counselor in your area can help you make a safety plan for yourself and your children.  When to call for help  Call 911 anytime you think you may need emergency care. For example, call if:  You think that you or someone you know is in danger of being abused.  You have been hurt and can't have someone safely take you to emergency care.  You have just been abused.  A family member has just been abused.  Where can you learn more?  Go to https://www.[employer].net/patiented  Enter S665 in the search box to learn more about \"Learning About Intimate Partner Violence.\"  Current as of: July 31, 2024  Content Version: 14.5    3336-2088 [employer].   Care instructions adapted under license by your healthcare professional. If you have questions about a medical condition or this instruction, always ask your healthcare professional. [employer] disclaims any warranty or liability for your use of this information.    Vaginal Bleeding During Pregnancy: Care Instructions  Overview     It's common to have some vaginal spotting when you are pregnant. In some cases, the bleeding isn't serious. And there aren't any more problems with the pregnancy.  But sometimes bleeding is a sign of a more serious problem. This is more common if the bleeding is heavy or painful. Examples of more serious problems include miscarriage, an ectopic pregnancy, and a problem with the placenta.  You may have to see your doctor again to be sure everything is okay. You may also need more tests to find the cause of the " bleeding.  Home treatment may be all you need. But it depends on what is causing the bleeding. Be sure to tell your doctor if you have any new symptoms or if your symptoms get worse.  The doctor has checked you carefully, but problems can develop later. If you notice any problems or new symptoms, get medical treatment right away.  Follow-up care is a key part of your treatment and safety. Be sure to make and go to all appointments, and call your doctor if you are having problems. It's also a good idea to know your test results and keep a list of the medicines you take.  How can you care for yourself at home?  If your doctor prescribed medicines, take them exactly as directed. Call your doctor if you think you are having a problem with your medicine.  Do not have vaginal sex until your doctor says it's okay.  Do not put anything in your vagina until your doctor says it's okay.  Ask your doctor about other activities you can or can't do.  Get a lot of rest. Being pregnant can make you tired.  Do not use nonsteroidal anti-inflammatory drugs (NSAIDs), such as ibuprofen (Advil, Motrin), naproxen (Aleve), or aspirin, unless your doctor says it is okay.  When should you call for help?   Call 911 anytime you think you may need emergency care. For example, call if:    You passed out (lost consciousness).     You have severe vaginal bleeding. This means you are soaking through a pad each hour for 2 or more hours.     You have sudden, severe pain in your belly or pelvis.   Call your doctor now or seek immediate medical care if:    You have new or worse vaginal bleeding.     You are dizzy or lightheaded, or you feel like you may faint.     You have pain in your belly, pelvis, or lower back.     You think that you are in labor.     You have a sudden release of fluid from your vagina.     You've been having regular contractions for an hour. This means that you've had at least 8 contractions within 1 hour or at least 4  contractions within 20 minutes, even after you change your position and drink fluids.     You notice that your baby has stopped moving or is moving much less than normal.   Watch closely for changes in your health, and be sure to contact your doctor if you have any problems.  Current as of: April 30, 2024  Content Version: 14.5    1551-0341 [employer].   Care instructions adapted under license by your healthcare professional. If you have questions about a medical condition or this instruction, always ask your healthcare professional. [employer] disclaims any warranty or liability for your use of this information.  Weeks 10 to 14 of Your Pregnancy: Care Instructions  It's now possible to hear the fetus's heartbeat with a special ultrasound device. And the fetus's organs are developing.     It's okay to exercise. Try activities such as walking or swimming. Check with your doctor before starting a new program.     10 things to know for weeks 10 to 14 of pregnancy   You may feel more tired than usual.  Taking naps during the day may help.    You may feel emotional.  It might help to talk to someone.    You may have headaches.  Try lying down and putting a cool cloth over your forehead.    You can use acetaminophen (Tylenol) for pain relief.  Don't take any anti-inflammatory medicines (such as Advil, Motrin, Aleve), unless your doctor says it's okay.    You may feel a fullness or aching in your lower belly.  This can feel like the kind of cramps you might get before a period. A back rub may help.    You may need to urinate more.  Your growing uterus and changing hormones can affect your bladder.    You may feel sick to your stomach (morning sickness).  Try avoiding food and smells that make you feel sick.    Your breasts may feel different.  They may feel tender or get bigger. Your nipples may get darker. Try a bra that gives you good support.    Avoid alcohol, tobacco, and drugs (including  "marijuana).  If you need help quitting, talk to your doctor.    Take a daily prenatal vitamin.  Choose one with folic acid.   Follow-up care is a key part of your treatment and safety. Be sure to make and go to all appointments, and call your doctor if you are having problems. It's also a good idea to know your test results and keep a list of the medicines you take.  When should you call for help?  Call 911  anytime you think you may need emergency care. For example, call if:  You have severe vaginal bleeding. You have soaked through one or more pads in an hour, and the bleeding is not slowing down.  You have chest pain, are short of breath, or cough up blood.  You have sudden, severe pain in your belly that does not go away.  You passed out (lost consciousness).  Call your doctor or midwife now or seek immediate medical care if:  You have a fever.  You have vaginal bleeding.  You are dizzy or lightheaded, or you feel like you may faint.  You have signs of a blood clot in your leg (called a deep vein thrombosis), such as:  Pain in the calf, back of the knee, thigh, or groin.  Swelling in your leg or groin.  A color change on the leg or groin. The skin may be reddish or purplish.  You have symptoms of a urinary tract infection. These may include:  Pain or burning when you urinate.  A frequent need to urinate without being able to pass much urine.  Pain in your low back (below the rib cage and above the waist).  Blood in your urine.  You have belly pain.  You think you are having contractions.  Watch closely for changes in your health, and be sure to contact your doctor or midwife if:  You have vaginal discharge that smells bad.  You feel sad, anxious, or hopeless for more than a few days.  You have other concerns about your pregnancy.  Where can you learn more?  Go to https://www.healthwise.net/patiented  Enter E090 in the search box to learn more about \"Weeks 10 to 14 of Your Pregnancy: Care Instructions.\"  Current " as of: April 30, 2024  Content Version: 14.5    2040-3310 [employer].   Care instructions adapted under license by your healthcare professional. If you have questions about a medical condition or this instruction, always ask your healthcare professional. [employer] disclaims any warranty or liability for your use of this information.      Nutrition During Pregnancy: Care Instructions  Overview     Healthy eating when you are pregnant is important for you and your baby. It can help you feel well and have a successful pregnancy and delivery. During pregnancy your nutrition needs increase. Even if you have excellent eating habits, your doctor may recommend a multivitamin to make sure you get enough iron and folic acid.  You may wonder how much weight you should gain. In general, if you were at a healthy weight before you became pregnant, then you should gain between 25 and 35 pounds. If you were overweight before pregnancy, then you'll likely be advised to gain 15 to 25 pounds. If you were underweight before pregnancy, then you'll probably be advised to gain 28 to 40 pounds. Your doctor will work with you to set a weight goal that is right for you. Gaining a healthy amount of weight helps you have a healthy baby.  Follow-up care is a key part of your treatment and safety. Be sure to make and go to all appointments, and call your doctor if you are having problems. It's also a good idea to know your test results and keep a list of the medicines you take.  How can you care for yourself at home?  Eat plenty of fruits and vegetables. Include a variety of orange, yellow, and leafy dark-green vegetables every day.  Choose whole-grain bread, cereal, and pasta. Good choices include whole wheat bread, whole wheat pasta, brown rice, and oatmeal.  Get 4 or more servings of milk and milk products each day. Good choices include nonfat or low-fat milk, yogurt, and cheese. If you cannot eat milk products, you  can get calcium from calcium-fortified products such as orange juice, soy milk, and tofu. Other non-milk sources of calcium include leafy green vegetables, such as broccoli, kale, mustard greens, turnip greens, bok chelsey, and brussels sprouts.  If you eat meat, pick lower-fat types. Good choices include lean cuts of meat and chicken or turkey without the skin.  Avoid fish that are high in mercury. These include shark, swordfish, adonay mackerel, marlin, orange roughy, and bigeye tuna, as well as tilefish from the [facility].  It's okay to eat up to 8 to 12 ounces a week of fish that are low in mercury or up to 4 ounces a week of fish that have medium levels of mercury. Some fish that are low in mercury are salmon, shrimp, canned light tuna, cod, and tilapia. Some fish that have medium levels of mercury are halibut and white albacore tuna.  For more advice about eating fish, you can visit the U.S. Food and Drug Administration (FDA) or U.S. Environmental Protection Agency (EPA) website.  Heat lunch meats (such as turkey, ham, or bologna) to 165 F before you eat them. This reduces your risk of getting sick from a kind of bacteria that can be found in lunch meats.  Do not eat unpasteurized soft cheeses, such as brie, feta, fresh mozzarella, and blue cheese. They have a bacteria that could harm your baby.  Limit caffeine to about 200 to 300 mg per day. On average, a cup of brewed coffee has around 80 to 100 mg of caffeine.  Do not drink any alcohol. No amount of alcohol has been found to be safe during pregnancy.  Do not diet or try to lose weight. For example, do not follow a low-carbohydrate diet. If you are overweight at the start of your pregnancy, your doctor will work with you to manage your weight gain.  Tell your doctor about all vitamins and supplements you take.  When should you call for help?  Watch closely for changes in your health, and be sure to contact your doctor if you have any problems.  Where can you  "learn more?  Go to https://www.[employer].net/patiented  Enter Y785 in the search box to learn more about \"Nutrition During Pregnancy: Care Instructions.\"  Current as of: October 7, 2024  Content Version: 14.5 2024-2025 [employer].   Care instructions adapted under license by your healthcare professional. If you have questions about a medical condition or this instruction, always ask your healthcare professional. [employer] disclaims any warranty or liability for your use of this information.    Exercise During Pregnancy: Care Instructions  Overview     Exercise is good for you during a healthy pregnancy. It can relieve back pain, swelling, and other discomforts. It also prepares your muscles for childbirth. And exercise can improve your energy level and help you sleep better.  If your doctor advises it, get more exercise. For example, walking is a good choice. Bit by bit, increase the amount you walk every day. Try for at least 30 minutes on most days of the week. You could also try a prenatal exercise class. But if you do not already exercise, be sure to talk with your doctor before you start a new exercise program. Doctors do not recommend contact sports during pregnancy.  Follow-up care is a key part of your treatment and safety. Be sure to make and go to all appointments, and call your doctor if you are having problems. It's also a good idea to know your test results and keep a list of the medicines you take.  How can you care for yourself at home?  Talk with your doctor about the right kind of exercise for each stage of pregnancy.  Listen to your body to know if your exercise is at a safe level.  Do not become overheated while you exercise. High body temperature can be harmful. Avoid activities that can make your body too hot.  If you feel tired, take it easy. You might walk instead of run.  If you are used to strenuous exercise, ask your doctor how to know when it's time to slow " "down.  If you exercised before getting pregnant, you should be able to keep up your routine early in your pregnancy. Later in your pregnancy, you may want to switch to more gentle activities.  Drink plenty of fluids before, during, and after exercise.  Avoid contact sports, such as soccer and basketball. Also avoid risky activities. These include scuba diving, horseback riding, and exercising at a high altitude (above 6,000 feet). If you live in a place with a high altitude, talk to your doctor about how you can exercise safely.  Do not get overtired while you exercise. You should be able to talk while you work out.  After your fourth month of pregnancy, avoid exercises that require you to lie flat on your back on a hard surface. These include sit-ups and some yoga poses.  Get plenty of rest. You may be very tired while you are pregnant.  Where can you learn more?  Go to https://www.[employer].net/patiented  Enter S801 in the search box to learn more about \"Exercise During Pregnancy: Care Instructions.\"  Current as of: April 30, 2024  Content Version: 14.5    9461-8933 [employer].   Care instructions adapted under license by your healthcare professional. If you have questions about a medical condition or this instruction, always ask your healthcare professional. [employer] disclaims any warranty or liability for your use of this information.    Learning About Pregnancy When You Are Underweight or Overweight  How does your weight affect your pregnancy?    The basics of prenatal care are the same for everyone, regardless of size. You'll get what you need to have a healthy baby.  But if you are not at a weight that is healthy for you, it can make a difference in a few things. Being underweight or overweight can increase the chances of some problems during pregnancy. So your doctor or midwife will pay close attention to your health and your baby's health. You may have some extra doctor or midwife " visits and tests. And you may have some tests earlier in your pregnancy.  Work with your doctor or midwife to get the care you need. Go to all your doctor or midwife visits, and follow their advice about what to do and what to avoid during pregnancy.  How much weight gain is healthy during pregnancy?  There's no fixed number of pounds that you should be aiming for. Instead, there's a range of weight gain that's good for you and your baby. Based on your weight before pregnancy, experts say it's generally best to gain about:  28 lb (13 kg) to 40 lb (18 kg) if you're underweight.  25 lb (11 kg) to 35 lb (16 kg) if you're at a healthy weight.  15 lb (7 kg) to 25 lb (11 kg) if you're overweight.  11 lb (5 kg) to 20 lb (9 kg) if you're very overweight (obese). In some cases, a doctor may recommend that you don't gain any weight.  If you have questions about weight gain during pregnancy, talk with your doctor about what's right for you. Gaining a healthy amount of weight helps you have a healthy pregnancy.  How much extra food do you need to eat?  How much food you need to eat during pregnancy depends on:  Your height.  How much you weigh when you get pregnant.  How active you are.  If you're carrying more than one fetus (multiple pregnancy).  In the first trimester, you'll probably need the same amount of calories as you did before you were pregnant. In general, in your second trimester, you need to eat about 340 extra calories a day. In your third trimester, you need to eat about 450 extra calories a day.  What can you do to have a healthy pregnancy?  The best things you can do for you and your baby are to eat healthy foods, get regular exercise, avoid alcohol and smoking, and go to your doctor or midwife visits.  Eat a variety of foods from all the food groups. Make sure to get enough calcium and folic acid. Ask your doctor or midwife how much folic acid you should be taking.  You may want to work with a dietitian to  "help you plan healthy meals to get the right amount of calories for you.  Talk to your doctor or midwife about how you can exercise safely. If you didn't exercise much before you got pregnant, talk to your doctor or midwife about how you can slowly get more active. They may want to set up an exercise program with you.  Where can you learn more?  Go to https://www.[employer].net/patiented  Enter B644 in the search box to learn more about \"Learning About Pregnancy When You Are Underweight or Overweight.\"  Current as of: April 30, 2024  Content Version: 14.5    6417-9117 [employer].   Care instructions adapted under license by your healthcare professional. If you have questions about a medical condition or this instruction, always ask your healthcare professional. [employer] disclaims any warranty or liability for your use of this information.    You have been provided the CDC Warning Signs in Pregnancy document.    Additional copies can be found here: www.[employer].com/729132.pdf  "

## 2025-06-23 NOTE — PROGRESS NOTES
Telephone visit with patient for New Prenatal Intake and Education. This is patient's second pregnancy. Handouts reviewed and will be provided at next prenatal appointment. Scheduled for New Prenatal with Jyoti BURRIS CNP on 6/26/2025.       Prenatal OB Questionnaire  Patient supplied answers from flow sheet for:  Prenatal OB Questionnaire.  Past Medical History  Have you ever recieved care for your mental health? : No  Have you ever been in a major accident or suffered serious trauma?: No  Within the last year, has anyone hit, slapped, kicked or otherwise hurt you?: No  In the last year, has anyone forced you to have sex when you didn't want to?: No    Past Medical History 2   Have you ever received a blood transfusion?: No  Would you accept a blood transfusion if was medically recommended?: (!) No  Does anyone in your home smoke?: No   Is your blood type Rh negative?: Unknown  Have you ever ?: No  Have you been hospitalized for a nonsurgical reason excluding normal delivery?: No  Have you ever had an abnormal pap smear?: No    Past Medical History (Continued)  Do you have a history of abnormalities of the uterus?: No  Did your mother take AASHISH or any other hormones when she was pregnant with you?: No  Do you have any other problems we have not asked about which you feel may be important to this pregnancy?: No         Allergies as of 6/23/2025:    Allergies as of 06/23/2025    (No Known Allergies)       Current medications are:  Current Outpatient Medications   Medication Sig Dispense Refill    nitroFURantoin macrocrystal-monohydrate (MACROBID) 100 MG capsule  (Patient not taking: Reported on 6/23/2025)      Prenatal Vit-Fe Fumarate-FA (TRINATAL RX 1) 60-1 MG TABS Take 1 tablet by mouth.

## 2025-06-25 LAB
ABO + RH BLD: NORMAL
BLD GP AB SCN SERPL QL: NEGATIVE
SPECIMEN EXP DATE BLD: NORMAL

## 2025-06-25 NOTE — PATIENT INSTRUCTIONS
"Weeks 6 to 10 of Your Pregnancy: Care Instructions  During these weeks of pregnancy, your body goes through many changes. You may start to feel different, both in your body and your emotions. Each pregnancy is different, so there's no \"right\" way to feel. These early weeks are a time to make healthy choices for you and your pregnancy.    Take a daily prenatal vitamin. Choose one with folic acid in it.   Avoid alcohol, tobacco, and drugs (including marijuana). If you need help quitting, talk to your doctor.     Drink plenty of liquids.  Be sure to drink enough water. And limit sodas, other sweetened drinks, and caffeine.     Choose foods that are good sources of calcium, iron, and folate.  You can try dairy products, dark leafy greens, fortified orange juice and cereals, almonds, broccoli, dried fruit, and beans.     Avoid foods that may be harmful.  Don't eat raw meat, deli meat, raw seafood, or raw eggs. Avoid soft cheese and unpasteurized dairy, like Brie and blue cheese. And don't eat fish that contains a lot of mercury, like shark and swordfish.     Don't touch shilo litter or cat poop.  They can cause an infection that could be harmful during pregnancy.     Avoid things that can make your body too hot.  For example, avoid hot tubs and saunas.     Soothe morning sickness.  Try eating 5 or 6 small meals a day, getting some fresh air, or using pam to control symptoms.     Ask your doctor about flu and COVID-19 shots.  Getting them can help protect against infection.   Follow-up care is a key part of your treatment and safety. Be sure to make and go to all appointments, and call your doctor if you are having problems. It's also a good idea to know your test results and keep a list of the medicines you take.  Where can you learn more?  Go to https://www.healthwise.net/patiented  Enter G112 in the search box to learn more about \"Weeks 6 to 10 of Your Pregnancy: Care Instructions.\"  Current as of: April 30, " 2024  Content Version: 14.5 2024-2025 [employer].   Care instructions adapted under license by your healthcare professional. If you have questions about a medical condition or this instruction, always ask your healthcare professional. [employer] disclaims any warranty or liability for your use of this information.       Pregnancy: Managing Morning Sickness (01:48)  Your health professional recommends that you watch this short online health video.  Learn how to manage morning sickness during pregnancy.   Purpose: Learn how to manage morning sickness during pregnancy.  Goal: Learn how to manage morning sickness during pregnancy.    Watch: Scan the QR code or visit the link to view video       [email or web address]  Current as of: April 30, 2024  Content Version: 14.5 2024-2025 [employer].   Care instructions adapted under license by your healthcare professional. If you have questions about a medical condition or this instruction, always ask your healthcare professional. [employer] disclaims any warranty or liability for your use of this information.    Pregnancy and Heartburn: Care Instructions  Overview     Heartburn is a common problem during pregnancy.  Heartburn happens when stomach acid backs up into the tube that carries food to the stomach. This tube is called the esophagus. Early in pregnancy, heartburn is caused by hormone changes that slow down digestion. Later on, it's also caused by the large uterus pushing up on the stomach.  Even though you can't fix the cause, there are things you can do to get relief. Treating heartburn during pregnancy focuses first on making lifestyle changes, like changing what and how you eat, and on taking medicines.  Heartburn usually improves or goes away after childbirth.  Follow-up care is a key part of your treatment and safety. Be sure to make and go to all appointments, and call your doctor if you are having  "problems. It's also a good idea to know your test results and keep a list of the medicines you take.  How can you care for yourself at home?  Eat small, frequent meals.  Avoid foods that make your symptoms worse, such as chocolate, peppermint, and spicy foods. Avoid drinks with caffeine, such as coffee, tea, and sodas.  Avoid bending over or lying down after meals.  Take a short walk after you eat.  If heartburn is a problem at night, do not eat for 2 hours before bedtime.  Take antacids like Mylanta, Maalox, Rolaids, or Tums. Do not take antacids that have sodium bicarbonate, magnesium trisilicate, or aspirin. Be careful when you take over-the-counter antacid medicines. Many of these medicines have aspirin in them. While you are pregnant, do not take aspirin or medicines that contain aspirin unless your doctor says it is okay.  If you're not getting relief, talk to your doctor. You may be able to take a stronger acid-reducing medicine.  When should you call for help?   Call your doctor now or seek immediate medical care if:    You have new or worse belly pain.     You are vomiting.   Watch closely for changes in your health, and be sure to contact your doctor if:    You have new or worse symptoms of reflux.     You are losing weight.     You have trouble or pain swallowing.     You do not get better as expected.   Where can you learn more?  Go to https://www.[employer].net/patiented  Enter U946 in the search box to learn more about \"Pregnancy and Heartburn: Care Instructions.\"  Current as of: April 30, 2024  Content Version: 14.5    6982-7392 [employer].   Care instructions adapted under license by your healthcare professional. If you have questions about a medical condition or this instruction, always ask your healthcare professional. [employer] disclaims any warranty or liability for your use of this information.    Constipation: Care Instructions  Overview     Constipation means that you " have a hard time passing stools (bowel movements). People pass stools from 3 times a day to once every 3 days. What is normal for you may be different. Constipation may occur with pain in the rectum and cramping. The pain may get worse when you try to pass stools. Sometimes there are small amounts of bright red blood on toilet paper or the surface of stools. This is because of enlarged veins near the rectum (hemorrhoids).  A few changes in your diet and lifestyle may help you avoid ongoing constipation. Your doctor may also prescribe medicine to help loosen your stool.  Some medicines can cause constipation. These include pain medicines and antidepressants. Tell your doctor about all the medicines you take. Your doctor may want to make a medicine change to ease your symptoms.  Follow-up care is a key part of your treatment and safety. Be sure to make and go to all appointments, and call your doctor if you are having problems. It's also a good idea to know your test results and keep a list of the medicines you take.  How can you care for yourself at home?  Drink plenty of fluids. If you have kidney, heart, or liver disease and have to limit fluids, talk with your doctor before you increase the amount of fluids you drink.  Include high-fiber foods in your diet each day. These include fruits, vegetables, beans, and whole grains.  Get at least 30 minutes of exercise on most days of the week. Walking is a good choice. You also may want to do other activities, such as running, swimming, cycling, or playing tennis or team sports.  Take a fiber supplement, such as Citrucel or Metamucil, every day. Read and follow all instructions on the label.  Schedule time each day for a bowel movement. A daily routine may help. Take your time having a bowel movement, but don't sit for more than 10 minutes at a time. And don't strain too much.  Support your feet with a small step stool when you sit on the toilet. This helps flex your hips  "and places your pelvis in a squatting position.  Your doctor may recommend an over-the-counter laxative to relieve your constipation. Examples are Milk of Magnesia and MiraLax. Read and follow all instructions on the label. Do not use laxatives on a long-term basis.  When should you call for help?   Call your doctor now or seek immediate medical care if:    You have new or worse belly pain.     You have new or worse nausea or vomiting.     You have blood in your stools.   Watch closely for changes in your health, and be sure to contact your doctor if:    Your constipation is getting worse.     You do not get better as expected.   Where can you learn more?  Go to https://www.[email or web address].net/patiented  Enter P343 in the search box to learn more about \"Constipation: Care Instructions.\"  Current as of: October 19, 2024  Content Version: 14.5 2024-2025 [employer].   Care instructions adapted under license by your healthcare professional. If you have questions about a medical condition or this instruction, always ask your healthcare professional. [employer] disclaims any warranty or liability for your use of this information.    Learning About High-Iron Foods  What foods are high in iron?     The foods you eat contain nutrients, such as vitamins and minerals. Iron is a nutrient. Your body needs the right amount to stay healthy and work as it should. You can use the list below to help you make choices about which foods to eat.  Here are some foods that contain iron. They have 1 to 2 milligrams of iron per serving.  Fruits  Figs (dried), 5 figs  Vegetables  Asparagus (canned), 6 morales  Mona, beet, Swiss chard, or turnip greens, 1 cup  Dried peas, cooked,   cup  Seaweed, spirulina (dried),   cup  Spinach, (cooked)   cup or (raw) 1 cup  Grains  Cereals, fortified with iron, 1 cup  Grits (instant, cooked), fortified with iron,   cup  Meats and other protein foods  Beans (kidney, lima, navy, " "white), canned or cooked,   cup  Beef or lamb, 3 oz  Chicken giblets, 3 oz  Chickpeas (garbanzo beans),   cup  Liver of beef, lamb, or pork, 3 oz  Oysters (cooked), 3 oz  Sardines (canned), 3 oz  Soybeans (boiled),   cup  Tofu (firm),   cup  Work with your doctor to find out how much of this nutrient you need. Depending on your health, you may need more or less of it in your diet.  Where can you learn more?  Go to https://www.[employer].net/patiented  Enter R005 in the search box to learn more about \"Learning About High-Iron Foods.\"  Current as of: October 7, 2024  Content Version: 14.5 2024-2025 [employer].   Care instructions adapted under license by your healthcare professional. If you have questions about a medical condition or this instruction, always ask your healthcare professional. [employer] disclaims any warranty or liability for your use of this information.    Rh Antibodies Screening During Pregnancy: About This Test  What is it?     The Rh antibodies screening test is a blood test. It checks your blood for Rh antibodies. If you have Rh-negative blood and have been exposed to Rh-positive blood, your immune system may make antibodies to attack the Rh-positive blood. When a pregnant woman has these antibodies, it is called Rh sensitization.  Why is this test done?  The Rh antibodies screening test is done during pregnancy to find out if your baby is at risk for Rh disease. This can happen if you have Rh-negative blood and your baby has Rh-positive blood. If your Rh-negative blood mixes with Rh-positive blood, your immune system will make antibodies to attack the Rh-positive blood.  During pregnancy, these antibodies could attach to the baby's red blood cells. This can cause your baby to have serious health problems. The results of this test will help your doctor know how to best care for you and your baby during your pregnancy.  How do you prepare for the test?  In general, " "there's nothing you have to do before this test, unless your doctor tells you to.  How is the test done?  A health professional uses a needle to take a blood sample, usually from the arm.  What happens after the test?  You will probably be able to go home right away. It depends on the reason for the test.  You can go back to your usual activities right away.  Follow-up care is a key part of your treatment and safety. Be sure to make and go to all appointments, and call your doctor if you are having problems. It's also a good idea to keep a list of the medicines you take. Ask your doctor when you can expect to have your test results.  Where can you learn more?  Go to https://www.[employer].net/patiented  Enter P722 in the search box to learn more about \"Rh Antibodies Screening During Pregnancy: About This Test.\"  Current as of: 2024  Content Version: 14.5    4114-3089 [employer].   Care instructions adapted under license by your healthcare professional. If you have questions about a medical condition or this instruction, always ask your healthcare professional. [employer] disclaims any warranty or liability for your use of this information.    Learning About Preventing Rh Disease  What is Rh disease?    Rh disease can be a serious problem in pregnancy. It happens when substances called antibodies in the mother's blood cause red blood cells in her baby's blood to be destroyed. This can occur when the blood types of a mother and her baby do not match.  All blood has an Rh factor. This is what makes a blood type positive or negative. When you are Rh-negative, your baby may be Rh-negative or Rh-positive. If your baby has Rh-positive blood and it mixes with yours, your body will make antibodies. This is called Rh sensitization.  Most of the time, this is not a problem in a first pregnancy. But in future pregnancies, it could cause Rh disease.  A  with Rh disease has mild anemia and " "may have jaundice. In severe cases, anemia, jaundice, and swelling can be very dangerous or fatal. Some babies need to be delivered early. Some need special care in the NICU. A very sick baby will need a blood transfusion before or after birth.  Fortunately, Rh sensitization is usually easy to prevent.  That's why it's important to get your Rh status checked in your first trimester. It doesn't cause any warning signs. A blood test is the only way to know if you are Rh-sensitive or are at risk for it.  How can you prevent Rh disease?  If you are Rh-negative, an Rh immune globulin shot (such as RhoGAM) can help prevent your body from making the antibodies that attack your baby's red blood cells.  Timing is important. The shot is given at certain times during your pregnancy. And it is given anytime there is a chance that your baby's blood might mix with yours. That can happen with certain prenatal tests or when you have pregnancy bleeding, such as:  Right after any pregnancy loss, amniocentesis, or CVS testing.  After turning of a breech baby.  Before and maybe after childbirth. If you need the shot, it is given around week 28. If your  is Rh-positive, you will have another shot after birth.  Follow-up care is a key part of your treatment and safety. Be sure to make and go to all appointments, and call your doctor if you are having problems. It's also a good idea to know your test results and keep a list of the medicines you take.  Where can you learn more?  Go to https://www.[email or web address].net/patiented  Enter W177 in the search box to learn more about \"Learning About Preventing Rh Disease.\"  Current as of: 2024  Content Version: 14.5    4505-1975 [employer].   Care instructions adapted under license by your healthcare professional. If you have questions about a medical condition or this instruction, always ask your healthcare professional. [employer] disclaims any warranty or " liability for your use of this information.    Learning About Rh Immunoglobulin Shots  Introduction    An Rh immunoglobulin shot is given during pregnancy to prevent Rh sensitization. Rh sensitization can happen if you have Rh-negative blood and your baby has Rh-positive blood. If the two types of blood mix, your body will make antibodies against your baby's blood. Most of the time, this is not a problem the first time you're pregnant. But it could cause problems in future pregnancies.  This shot keeps your body from making the antibodies. If you need the shot, it is given around 28 weeks of pregnancy. After the birth, your baby's blood is tested. If the blood is Rh positive, you will receive another shot. The shot may also be given if you have vaginal bleeding while you are pregnant or if you have a miscarriage. These shots protect future pregnancies.  Example  Rh immunoglobulin (HypRho-D, MICRhoGAM, and RhoGAM)  Possible side effects  Rare side effects may include:  Some mild pain where you got the shot.  A slight fever.  An allergic reaction.  You may have other side effects not listed here. Check the information that comes with your medicine.  What to know about taking this medicine  You may need more than one shot. You may need the shot again:  After amniocentesis, fetal blood sampling, or chorionic villus sampling tests.  If you have bleeding in your second or third trimester.  After turning of a breech baby.  After an injury to the belly while you are pregnant.  After a miscarriage or an .  Before or right after treatment for an ectopic or a partial molar pregnancy.  Tell your doctor if you have any allergies or have had a bad response to medicines in the past.  If you get this shot within 3 months of getting a live-virus vaccine, the vaccine may not work. Your doctor will tell you if you need more vaccine.  Check with your doctor or pharmacist before you use any other medicines. This includes  "over-the-counter medicines. Make sure your doctor knows all of the medicines, vitamins, herbs, and supplements you take. Taking some medicines at the same time can cause problems.  Where can you learn more?  Go to https://www.[employer].net/patiented  Enter V615 in the search box to learn more about \"Learning About Rh Immunoglobulin Shots.\"  Current as of: April 30, 2024  Content Version: 14.5 2024-2025 [employer].   Care instructions adapted under license by your healthcare professional. If you have questions about a medical condition or this instruction, always ask your healthcare professional. [employer] disclaims any warranty or liability for your use of this information.    Rubella (Pakistani Measles): Care Instructions  Overview  Rubella, also called Pakistani measles or 3-day measles, is a disease caused by a virus. It spreads by coughs, sneezes, and close contact. Rubella usually is mild and does not cause long-term problems. But if you are pregnant and get it, you can give the disease to your unborn baby. This can cause serious birth defects.  While you have rubella, you may get a rash and a mild fever, and the lymph glands in your neck may swell. Older children often have a fever, eye pain, a sore throat, and body aches. You can relieve most symptoms with care at home. Avoid being around others, especially pregnant people, until your rash has been gone for at least 4 days. People who have not had this disease before or have not had the vaccine have the greatest chance of getting the virus.  Follow-up care is a key part of your treatment and safety. Be sure to make and go to all appointments, and call your doctor if you are having problems. It's also a good idea to know your test results and keep a list of the medicines you take.  How can you care for yourself at home?  Drink plenty of fluids. If you have kidney, heart, or liver disease and have to limit fluids, talk with your doctor " "before you increase the amount of fluids you drink.  Get plenty of rest to help your body heal.  Take an over-the-counter pain medicine, such as acetaminophen (Tylenol), ibuprofen (Advil, Motrin), or naproxen (Aleve), to reduce fever and discomfort. Read and follow all instructions on the label. Do not give aspirin to anyone younger than 20. It has been linked to Reye syndrome, a serious illness.  Do not take two or more pain medicines at the same time unless the doctor told you to. Many pain medicines have acetaminophen, which is Tylenol. Too much acetaminophen (Tylenol) can be harmful.  Try not to scratch the rash. Put cold, wet cloths on the rash to reduce itching.  Do not smoke. Smoking can make your symptoms worse. If you need help quitting, talk to your doctor about stop-smoking programs and medicines. These can increase your chances of quitting for good.  Avoid contact with people who have never had rubella and who have not been immunized.  When should you call for help?   Call your doctor now or seek immediate medical care if:    You have a fever with a stiff neck or a severe headache.     You are sensitive to light or feel very sleepy or confused.   Watch closely for changes in your health, and be sure to contact your doctor if:    You do not get better as expected.   Where can you learn more?  Go to https://www.[employer].net/patiented  Enter B812 in the search box to learn more about \"Rubella (Ind[place]ian Measles): Care Instructions.\"  Current as of: April 30, 2024  Content Version: 14.5    3647-1547 [employer].   Care instructions adapted under license by your healthcare professional. If you have questions about a medical condition or this instruction, always ask your healthcare professional. [employer] disclaims any warranty or liability for your use of this information.    Gonorrhea and Chlamydia: About These Tests  What is it?  These tests use a sample of urine or other body fluid " to look for the bacteria that cause these sexually transmitted infections (STIs). The fluid sample can come from the cervix, vagina, rectum, throat, or eyes.  Why is this test done?  These tests may be done to:  Find out if symptoms are caused by gonorrhea or chlamydia.  Check people who are at high risk of being infected with gonorrhea or chlamydia.  Retest people several months after they have been treated for gonorrhea or chlamydia.  Check for infection in your  if you had a gonorrhea or chlamydia infection at the time of delivery.  How can you prepare for the test?  If you are going to have a urine test, do not urinate for at least 1 hour before the test.  If you think you may have chlamydia or gonorrhea, don't have sexual intercourse until you get your test results. And you may want to have tests for other STIs, such as HIV.  How is the test done?  For a direct sample, a swab is used to collect body fluid from the cervix, vagina, rectum, throat, or eyes. Your doctor may collect the sample. Or you may be given instructions on how to collect your own sample.  For a urine sample, you will collect the urine that comes out when you first start to urinate. Don't wipe the genital area clean before you urinate.  How long does the test take?  The test will take a few minutes.  What happens after the test?  You will be able to go home right away.  You can go back to your usual activities right away.  If you do have an infection, don't have sexual intercourse for 7 days after you start treatment. And your sex partner(s) should also be treated.  Follow-up care is a key part of your treatment and safety. Be sure to make and go to all appointments, and call your doctor if you are having problems. It's also a good idea to keep a list of the medicines you take. Ask your doctor when you can expect to have your test results.  Where can you learn more?  Go to https://www.healthwise.net/patiented  Enter K976 in the search  "box to learn more about \"Gonorrhea and Chlamydia: About These Tests.\"  Current as of: April 30, 2024  Content Version: 14.5 2024-2025 [employer].   Care instructions adapted under license by your healthcare professional. If you have questions about a medical condition or this instruction, always ask your healthcare professional. [employer] disclaims any warranty or liability for your use of this information.    Trichomoniasis: About This Test  What is it?     This test uses a sample of urine or other body fluid to look for the tiny parasite that causes trichomoniasis (also called trich). The fluid sample can come from the vagina, cervix, or urethra. Your doctor may choose to use one or more of many available tests.  Why is it done?  A trich test may be done to:  Find out if symptoms are caused by trich.  Check people who are at high risk for being infected with trich.  Check after treatment to make sure that the infection is gone.  How do you prepare for the test?  If you are going to have a urine test, do not urinate for at least 1 hour before the test.  How is the test done?  For a direct sample, a swab is used to collect body fluid from the cervix, vagina, or urethra. Your doctor may collect the sample. Or you may be given instructions on how to collect your own sample.  For a urine sample, you will collect the urine that comes out when you first start to urinate. Don't wipe the area clean before you urinate.  How long does the test take?  It will take a few minutes to collect a sample.  What happens after the test?  You can go home right away.  You can go back to your usual activities right away.  You may get the test results the same day or several days later. It depends on the test used.  If you do have an infection, don't have sexual intercourse for 7 days after you start treatment. Your sex partner or partners should also be treated.  Follow-up care is a key part of your treatment " and safety. Be sure to make and go to all appointments, and call your doctor if you are having problems. Ask your doctor when you can expect to have your test results.  Current as of: April 30, 2024  Content Version: 14.5    9371-6893 [employer].   Care instructions adapted under license by your healthcare professional. If you have questions about a medical condition or this instruction, always ask your healthcare professional. [employer] disclaims any warranty or liability for your use of this information.    HIV Testing: Care Instructions  Overview  You can get tested for the human immunodeficiency virus (HIV). Most doctors use a blood test to check for HIV antibodies and antigens in your blood. It may also check for the genetic material (RNA) of HIV. Some tests use saliva to check for HIV antibodies. But these aren't as accurate. For example, they may give a false result if you've just been infected.  What do the results mean?        Normal (negative)   No HIV antibodies, antigens, or RNA were found.  You may need more testing. It can make sure your test results are correct.        Uncertain (indeterminate)   Test results didn't clearly show if you have an HIV infection.  HIV antibodies or antigens may not have formed yet.  Some other type of antibody or antigen may have affected the results.  You will need another test to be sure.        Abnormal (positive)   HIV antibodies, antigens, or RNA were found.  If you haven't had an RNA test yet, one will be done. If it's positive, you have HIV.  If your test result is positive, your doctor will talk to you. You will discuss starting treatment.  Follow-up care is a key part of your treatment and safety. Be sure to make and go to all appointments, and call your doctor if you are having problems. It's also a good idea to know your test results and keep a list of the medicines you take.  Where can you learn more?  Go to  "[email or web address]/patiented  Enter T792 in the search box to learn more about \"HIV Testing: Care Instructions.\"  Current as of: April 30, 2024  Content Version: 14.5 2024-2025 [employer].   Care instructions adapted under license by your healthcare professional. If you have questions about a medical condition or this instruction, always ask your healthcare professional. [employer] disclaims any warranty or liability for your use of this information.    Hepatitis C Virus Tests: About These Tests  What are they?     Hepatitis C virus tests are blood tests that check for substances in the blood that show whether you have hepatitis C now or had it in the past. The tests can also tell you what type of hepatitis C you have and how severe the disease is. This can help your doctor with treatment.  If the tests show that you have long-term hepatitis C, you need to take steps to prevent spreading the disease.  Why are these tests done?  You may need these tests if:  You have symptoms of hepatitis.  You may have been exposed to the virus. You are more likely to have been exposed to the virus if you inject drugs or are exposed to body fluids (such as if you are a health care worker).  You've had other tests that show you have liver problems.  You are 18 to 79 years old.  You have an HIV infection.  The tests also are done to help your doctor decide about your treatment and see how well it works.  How do you prepare for the test?  In general, there's nothing you have to do before this test, unless your doctor tells you to.  How is the test done?  A health professional uses a needle to take a blood sample, usually from the arm.  What happens after these tests?  You will probably be able to go home right away.  You can go back to your usual activities right away.  Follow-up care is a key part of your treatment and safety. Be sure to make and go to all appointments, and call your doctor if you " "are having problems. It's also a good idea to keep a list of the medicines you take. Ask your doctor when you can expect to have your test results.  Where can you learn more?  Go to https://www.[employer].net/patiented  Enter W551 in the search box to learn more about \"Hepatitis C Virus Tests: About These Tests.\"  Current as of: April 30, 2024  Content Version: 14.5    6934-5426 [employer].   Care instructions adapted under license by your healthcare professional. If you have questions about a medical condition or this instruction, always ask your healthcare professional. [employer] disclaims any warranty or liability for your use of this information.    Learning About Fetal Ultrasound Results  What is a fetal ultrasound?     Fetal ultrasound is a test that lets your doctor see an image of your baby. Your doctor learns information about your baby from this picture. You may find out, for example, if you are having a boy or a girl. But the main reason you have this test is to get information about your baby's health.  (You may hear your baby called a fetus. This is a common medical term for a baby that's growing in the mother's uterus.)  What kind of information can you learn from this test?  The findings of an ultrasound fall into two categories, normal and abnormal.  Normal  The fetus is the right size for its age.  The placenta is the expected size and does not cover the cervix.  There is enough amniotic fluid in the uterus.  No birth defects can be seen.  Abnormal  The fetus is small or large for its age.  The placenta covers the cervix.  There is too much or too little amniotic fluid in the uterus.  The fetus may have a birth defect.  What does an abnormal result mean?  Abnormal seems to imply that something is wrong with your baby. But what it means is that the test has shown something the doctor wants to take a closer look at.  And that's what happens next. Your doctor will talk to you " about what further test or tests you may need.  What do the results mean?  Some of the things your doctor may see on an abnormal ultrasound include:  Echogenic bowel.  The bowel looks very bright on the screen. This could mean that there's blood in the bowel. Or it could mean that something is blocking the small bowel.  Increased nuchal translucency.  The ultrasound measures the thickness at the back of the baby's neck. An increase in thickness is sometimes an early sign of Down syndrome.  Increased or decreased amniotic fluid.  The doctor will look for a reason for the level of amniotic fluid and will watch the pregnancy closely as it progresses.  Large ventricles.  Ventricles in the brain look larger than they should. Your doctor may take a closer look at the brain.  Renal pyelectasis/hydronephrosis.  The ultrasound measures the fluid around the kidney. If there is more fluid than expected, there is a chance of urinary tract or kidney problems.  Short long bones.  The ultrasound measures certain arm and leg bones. A long bone (humerus or femur) that is shorter than average could be a sign of Down syndrome.  Subchorionic hemorrhage.  An ultrasound can show bleeding under one of the membranes that surrounds the fetus. Some women don't have symptoms of bleeding. The ultrasound can find this problem when women are not bleeding from their vagina. Women who have this condition have a slightly higher chance of miscarriage.  What do you do now?  Take a deep breath, and let it out. Keep in mind that an abnormal finding on an ultrasound, after it's coupled with more information, may:  Turn out to be nothing.  Turn out to be something mild that won't affect the baby.  Turn out to be something more serious. But if this happens, early diagnosis helps you and your doctor plan treatment options sooner rather than later.  Your medical team is there for you. So are your family and friends. Ask questions, and get the help and  "support you need.  Follow-up care is a key part of your treatment and safety. Be sure to make and go to all appointments, and call your doctor if you are having problems. It's also a good idea to know your test results and keep a list of the medicines you take.  Where can you learn more?  Go to https://www.[employer].net/patiented  Enter K451 in the search box to learn more about \"Learning About Fetal Ultrasound Results.\"  Current as of: April 30, 2024  Content Version: 14.5    3935-6081 [employer].   Care instructions adapted under license by your healthcare professional. If you have questions about a medical condition or this instruction, always ask your healthcare professional. [employer] disclaims any warranty or liability for your use of this information.    Learning About Prenatal Visits  Overview     Regular prenatal visits are very important during any pregnancy. These quick office visits may seem simple and routine. But they can help you have a safe and healthy pregnancy. Your doctor is watching for problems that can only be found through regular checkups. The visits also give you and your doctor time to build a good relationship.  After your first visit, you will most likely start on a schedule of monthly visits. In your third trimester, the visits will get more frequent. Based on your health, your age, and if you've had a normal, full-term pregnancy before, your doctor may want to see you more or less often.  At different times in your pregnancy, you will have exams and tests. Some are routine. Others are done only when there is a chance of a problem. Everything healthy you do for your body helps you have a healthy pregnancy. Rest when you need it. Eat well, drink plenty of water, and exercise regularly.  What happens during a prenatal visit?  You will have blood pressure checks, along with urine tests. You also may have blood tests. If you need to go to the bathroom while waiting " for the doctor, tell the nurse. You will be given a sample cup so your urine can be tested.  You will be weighed and have your belly measured.  Your doctor may listen to the fetal heartbeat with a special device.  At about 24 weeks, and possibly earlier in your pregnancy, your doctor will check your blood sugar (glucose tolerance test) for diabetes that can occur during pregnancy. This is gestational diabetes, which can be harmful.  You will have tests to check for infections that could harm your . These include group B streptococcus and hepatitis B.  Your doctor may do ultrasounds to check for problems. This also checks the position of the fetus. An ultrasound uses sound waves to produce a picture of the fetus.  You may get your vaccines updated.  Your doctor may ask you questions to check for signs of anxiety or depression. Tell your doctor if you feel sad, anxious, or hopeless for more than a few days.  You may have other tests at any time during your pregnancy.  Use your visits to discuss with your doctor any concerns you have.  How can you care for yourself at home?  Get plenty of rest.  Try to exercise every day, if your doctor says it is okay. If you have not exercised in the past, start out slowly. For example, you can take short walks each day.  Choose healthy foods, such as fruits, vegetables, whole grains, lean proteins, low-fat dairy, and healthy fats.  Drink plenty of fluids. Cut down on drinks with caffeine, such as coffee, tea, and cola. If you have kidney, heart, or liver disease and have to limit fluids, talk with your doctor before you increase the amount of fluids you drink.  Try to avoid chemical fumes, paint fumes, and poisons.  If you smoke, vape, or use alcohol, marijuana, or other drugs, quit or cut back as much as you can. Talk to your doctor if you need help quitting.  Review all of your medicines, including over-the-counter medicines and supplements, with your doctor. Some of your  "routine medicines may need to be changed. Do not stop or start taking any medicines without talking to your doctor first.  Follow-up care is a key part of your treatment and safety. Be sure to make and go to all appointments, and call your doctor if you are having problems. It's also a good idea to know your test results and keep a list of the medicines you take.  Where can you learn more?  Go to https://www.[employer].net/patiented  Enter J502 in the search box to learn more about \"Learning About Prenatal Visits.\"  Current as of: April 30, 2024  Content Version: 14.5    8386-2904 [employer].   Care instructions adapted under license by your healthcare professional. If you have questions about a medical condition or this instruction, always ask your healthcare professional. [employer] disclaims any warranty or liability for your use of this information.    Intimate Partner Violence: Care Instructions  Overview     If you want to save this information but don't think it is safe to take it home, see if a trusted friend can keep it for you. Plan ahead. Know who you can call for help, and memorize the phone number.   Be careful online too. Your online activity may be seen by others. Do not use your personal computer or device to read about this topic. Use a safe computer, such as one at work, a friend's home, or a library.    Intimate partner violence--a type of domestic abuse--is different from an argument now and then. It is a pattern of abuse that one person may use to control another person's behavior. It may start with threats and name-calling. Then, it may lead to more serious acts, like pushing and slapping. The abuse also may occur in other areas. For example, the abuser may withhold money or spend a partner's money without their knowledge.  Abuse can cause serious harm. You are more likely to have a long-term health problem from the injuries and stress of living in a violent " relationship. People who are sexually abused by their partners have more sexually transmitted infections and unplanned pregnancies. Anyone who is abused also faces emotional pain. Anyone can be abused in relationships. In some relationships, both people use abusive behavior.  If you are pregnant, abuse can cause problems such as poor weight gain, infections, and bleeding. Abuse during this time may increase your baby's risk of low birth weight, premature birth, and death.  Follow-up care is a key part of your treatment and safety. Be sure to make and go to all appointments, and call your doctor if you are having problems. It's also a good idea to know your test results and keep a list of the medicines you take.  How can you care for yourself at home?  If you do not have a safe place to stay, discuss this with your doctor before you leave.  Have a plan for where to go, how to leave your home, and where to stay in case of an emergency. Do not tell your partner about your plan. Contact:  The National Domestic Violence Hotline toll-free at 1-465.424.4239. They can help you find resources in your area.  Your local police department, hospital, or clinic for information about shelters and safe homes near you.  Talk to a trusted friend or neighbor, a counselor, or a wesley leader. Do not feel that you have to hide what happened.  Teach your children how to call for help in an emergency.  Be alert to warning signs, such as threats, heavy alcohol use, or drug use. This can help you avoid danger.  If you can, make sure that there are no guns or other weapons in your home.  When should you call for help?   Call 911 anytime you think you may need emergency care. For example, call if:    You or someone else has just been abused.     You think you or someone else is in danger of being abused.   Watch closely for changes in your health, and be sure to contact your doctor if you have any problems.  Where can you learn more?  Go to  "https://www.[employer].net/patiented  Enter G282 in the search box to learn more about \"Intimate Partner Violence: Care Instructions.\"  Current as of: July 31, 2024  Content Version: 14.5 2024-2025 [employer].   Care instructions adapted under license by your healthcare professional. If you have questions about a medical condition or this instruction, always ask your healthcare professional. [employer] disclaims any warranty or liability for your use of this information.    Intimate Partner Violence Safety Instructions: Care Instructions  Overview     If you want to save this information but don't think it is safe to take it home, see if a trusted friend can keep it for you. Plan ahead. Know who you can call for help, and memorize the phone number.   Be careful online too. Your online activity may be seen by others. Do not use your personal computer or device to read about this topic. Use a safe computer, such as one at work, a friend's home, or a library.    When you are abused by a spouse or partner, you can take actions to protect yourself and your children.  You can increase your safety whether you decide to stay with your spouse or partner or you decide to leave. You may want to make a safety plan and pack a bag ahead of time. This will help you leave quickly when you decide to. Remember, you cannot change your partner's actions, but you can find help for you and your children. No one deserves to be abused.  Follow-up care is a key part of your treatment and safety. Be sure to make and go to all appointments, and call your doctor if you are having problems. It's also a good idea to know your test results and keep a list of the medicines you take.  How can you care for yourself at home?  Make a plan for your safety   If you decide to stay with your abusive spouse or partner, you can do the following to increase your safety:  Decide what works best to keep you safe in an " emergency.  Know who you can call to help you in an emergency.  Decide if you will call the police if you get hurt again. If you can, agree on a signal with your children or neighbor to call the police for you if you need help. You can flash lights or hang something out of a window.  Choose a safe place to go for a short time if you need to leave home. Memorize the address and phone number.  Learn escape routes out of your home in case you need to leave in a hurry. Teach your children different ways to get out of your home quickly if they need to.  If you can, hide or lock up things that can be used as weapons (guns, knives, hammers).  Learn the number of a domestic violence shelter. Talk to the people there about how they can help.  Find out about other community resources that can help you.  Take pictures of bruises or other injuries if you can. You can also take pictures of things your abuser has broken.  Teach your children that violence is never okay. Tell them that they do not deserve to be hurt.  Pack a bag   Prepare a bag with things you will need if you leave suddenly. Leave it with a friend, a relative, or someone else you trust. You could include the following items in the bag:  A set of keys to your home and car.  Emergency phone numbers and addresses.  Money such as cash or checks. You can also ask a friend, a relative, or someone else you trust to hold money for you.  Copies of legal documents such as house and car titles or rent receipts, birth certificates, Social Security card, voter registration, marriage and 's licenses, and your children's health records.  Personal items you would need for a few days, such as clothes, a toothbrush, toothpaste, and any medicines you or your children need.  A favorite toy or book for your child or children.  Diapers and bottles, if you have very young children.  Pictures that show signs of abuse and violence. You may also add pictures of your abuser.  If you  "leave   If you decide to leave, you can take the following steps:  Go to the emergency room at a hospital if you have been hurt.  Think about asking the police to be with you as you leave. They can protect you as you leave your home.  If you decide to leave secretly, remember that activities can be tracked. Your abuser may still have access to your cell phone, email, and credit cards. It may be possible for these to be traced. Always be aware of your surroundings.  If this is an emergency, do not worry about gathering up anything. Just leave--your safety is most important.  If your abuser moves out, change the locks on the doors. If you have a security system, change the access code.  When should you call for help?   Call 911 anytime you think you may need emergency care. For example, call if:    You or someone else has just been abused.     You think you or someone else is in danger of being abused.   Watch closely for changes in your health, and be sure to contact your doctor if you have any problems.  Where can you learn more?  Go to https://www.[employer].net/patiented  Enter A752 in the search box to learn more about \"Intimate Partner Violence Safety Instructions: Care Instructions.\"  Current as of: July 31, 2024  Content Version: 14.5 2024-2025 [employer].   Care instructions adapted under license by your healthcare professional. If you have questions about a medical condition or this instruction, always ask your healthcare professional. [employer] disclaims any warranty or liability for your use of this information.    Learning About Intimate Partner Violence  What is intimate partner violence?  Intimate partner violence is a type of domestic abuse. It's threatening, emotionally harmful, or violent behavior in a personal relationship. It can happen between past or current partners or spouses. In some relationships both people abuse each other. One partner may be more abusive. Or the " abuse may be equal.  Abuse can affect people of any ethnic group, race, or Tenriism. It can affect teens, adults, or the elderly. And it can happen to people of any sexual orientation, gender, or social status.  Abusers use fear, bullying, and threats to control their partners. They may control what their partners do. They may control where their partners go or who they see. They may act jealous, controlling, or possessive. These early signs of abuse may happen soon after the start of the relationship. Sometimes it can be hard to notice abuse at first. But after the relationship becomes more serious, the abuse may get worse.  If you are being abused in your relationship, it's important to get help. The abuse is not your fault. You don't have to face it alone.  Be careful  It may not be safe to take home domestic abuse information like this handout. Some people ask a trusted friend to keep it for them. It's also important to plan ahead and to memorize the phone number of places you can go for help. If you are concerned about your safety, do not use your computer, smartphone, or tablet to read about domestic abuse.   What are the types of intimate partner violence?  Abuse can happen in different ways. Each type can happen on its own or in combination with others.  Emotional abuse  Emotional abuse is a pattern of threats, insults, or controlling behavior. It includes verbal abuse. It goes beyond healthy disagreements in a relationship. It's a sign of an unhealthy relationship.  Do you feel threatened, intimidated, or controlled?  Does your partner:  Threaten your children, other family members, or pets?  Use jokes meant to embarrass or shame you?  Call you names?  Tell you that you are a bad parent?  Threaten to take away your children?  Threaten to have you or your family members deported?  Control your access to money or other basic needs?  Control what you do, who you see or talk to, or where you go?  Another form  of emotional abuse is denying that it is happening. Or the abuser may act like the abuse is no big deal or is your fault.  Sexual abuse  With sexual abuse, abusers may try to convince or force you to have sex. They may force you into sex acts you're not comfortable with. Or they may sexually assault you. Sexual abuse can happen even if you are in a committed relationship.  Physical abuse  Physical abuse means that a partner hits, kicks, or does something else to physically hurt you. Physical abuse that starts with a slap might lead to kicking, shoving, and choking over time. The abuser may also threaten to hurt or kill you.  Stalking  Stalking means that an abuser gives you attention that you do not want and that causes you fear. Examples of stalking include:  Following you.  Showing up at places where the abuser isn't invited, such as at your work or school.  Constantly calling or texting you.  What problems can  to?  Intimate partner violence can be very dangerous. It can cause serious, repeated injury. It can even lead to death.  All forms of abuse can cause long-term health problems from the stress of a violent relationship. Verbal abuse can lead to sexual and physical abuse.  Abuse causes:  Emotional pain.  Depression.  Anxiety.  Post-traumatic stress.  Sexual abuse can lead to sexually transmitted infections (such as HIV/AIDS) and unplanned pregnancy.  Pregnancy can be a very dangerous time for people in abusive relationships. Abuse can cause or increase the risk of problems during pregnancy. These include low weight gain, anemia, infections, and bleeding. Abuse may also increase your baby's risk of low birth weight, premature birth, and death.  It can be hard for some victims of abuse to ask for help or to leave their relationship. You may feel scared, stuck, or not sure what steps to take. But it's important not to ignore abuse. Talking to someone you trust could be the first step to ending the abuse  "and taking care of your own health and happiness again. There are resources available that can help keep you safe.  Where can you get help?  Talk to a trusted friend. Find a local advocacy group, or talk to your doctor about the abuse.  Contact the National Domestic Violence Hotline at 9-993-157-MSNO (1-320.467.5325) for more safety tips. They can guide you to groups in your area that can help. Or go to the National Coalition Against Domestic Violence website at www.theFeed.fm.org to learn more.  Domestic violence groups or a counselor in your area can help you make a safety plan for yourself and your children.  When to call for help  Call 911 anytime you think you may need emergency care. For example, call if:  You think that you or someone you know is in danger of being abused.  You have been hurt and can't have someone safely take you to emergency care.  You have just been abused.  A family member has just been abused.  Where can you learn more?  Go to https://www.[employer]PEK.net/patiented  Enter S665 in the search box to learn more about \"Learning About Intimate Partner Violence.\"  Current as of: July 31, 2024  Content Version: 14.5 2024-2025 [employer].   Care instructions adapted under license by your healthcare professional. If you have questions about a medical condition or this instruction, always ask your healthcare professional. [employer] disclaims any warranty or liability for your use of this information.    Vaginal Bleeding During Pregnancy: Care Instructions  Overview     It's common to have some vaginal spotting when you are pregnant. In some cases, the bleeding isn't serious. And there aren't any more problems with the pregnancy.  But sometimes bleeding is a sign of a more serious problem. This is more common if the bleeding is heavy or painful. Examples of more serious problems include miscarriage, an ectopic pregnancy, and a problem with the placenta.  You may have to " see your doctor again to be sure everything is okay. You may also need more tests to find the cause of the bleeding.  Home treatment may be all you need. But it depends on what is causing the bleeding. Be sure to tell your doctor if you have any new symptoms or if your symptoms get worse.  The doctor has checked you carefully, but problems can develop later. If you notice any problems or new symptoms, get medical treatment right away.  Follow-up care is a key part of your treatment and safety. Be sure to make and go to all appointments, and call your doctor if you are having problems. It's also a good idea to know your test results and keep a list of the medicines you take.  How can you care for yourself at home?  If your doctor prescribed medicines, take them exactly as directed. Call your doctor if you think you are having a problem with your medicine.  Do not have vaginal sex until your doctor says it's okay.  Do not put anything in your vagina until your doctor says it's okay.  Ask your doctor about other activities you can or can't do.  Get a lot of rest. Being pregnant can make you tired.  Do not use nonsteroidal anti-inflammatory drugs (NSAIDs), such as ibuprofen (Advil, Motrin), naproxen (Aleve), or aspirin, unless your doctor says it is okay.  When should you call for help?   Call 911 anytime you think you may need emergency care. For example, call if:    You passed out (lost consciousness).     You have severe vaginal bleeding. This means you are soaking through a pad each hour for 2 or more hours.     You have sudden, severe pain in your belly or pelvis.   Call your doctor now or seek immediate medical care if:    You have new or worse vaginal bleeding.     You are dizzy or lightheaded, or you feel like you may faint.     You have pain in your belly, pelvis, or lower back.     You think that you are in labor.     You have a sudden release of fluid from your vagina.     You've been having regular  contractions for an hour. This means that you've had at least 8 contractions within 1 hour or at least 4 contractions within 20 minutes, even after you change your position and drink fluids.     You notice that your baby has stopped moving or is moving much less than normal.   Watch closely for changes in your health, and be sure to contact your doctor if you have any problems.  Current as of: April 30, 2024  Content Version: 14.5    [id number] [employer].   Care instructions adapted under license by your healthcare professional. If you have questions about a medical condition or this instruction, always ask your healthcare professional. [employer] disclaims any warranty or liability for your use of this information.                                                       If you have any questions regarding your visit, Please contact your care team.     [employer]: [phone]  To Schedule an Appointment 24/7  Call: [phone] TELEPHONE NUMBER     Jyoti Fonseca- APRN BENITO Alan-PRECIOUS [place]-PRECIOUS [place]-PRECIOUS                   Monday 7:30am-2:00pm    Tuesday 7:30am-4:00pm    Wednesday 7:30am-2:00pm    Thursday 7:30am-11:30am    Friday 7:30am-12:00pm [place] [address] [place] [address]  Phone- [phone]   Fax- [phone]     Imaging Scheduling all locations  [phone]    [facility]  [address] [place] [address] [place] [address] [phone]         Urgent Care locations:  [facility]   Monday-Friday  10 am - 8 pm  Saturday and Sunday   9 am - 5 pm     [phone]   If you need a medication refill, please contact your pharmacy. Please allow 3 business days for your refill to be completed.  As always, Thank you for trusting us with your healthcare needs!      see additional instructions from your care team below

## 2025-06-26 ENCOUNTER — ANCILLARY PROCEDURE (OUTPATIENT)
Dept: ULTRASOUND IMAGING | Facility: CLINIC | Age: 20
End: 2025-06-26
Attending: NURSE PRACTITIONER
Payer: MEDICAID

## 2025-06-26 ENCOUNTER — PRENATAL OFFICE VISIT (OUTPATIENT)
Dept: OBGYN | Facility: CLINIC | Age: 20
End: 2025-06-26
Payer: MEDICAID

## 2025-06-26 VITALS
DIASTOLIC BLOOD PRESSURE: 70 MMHG | HEART RATE: 66 BPM | WEIGHT: 96 LBS | SYSTOLIC BLOOD PRESSURE: 108 MMHG | OXYGEN SATURATION: 99 % | BODY MASS INDEX: 19.35 KG/M2 | HEIGHT: 59 IN

## 2025-06-26 DIAGNOSIS — O09.299 HISTORY OF FETAL ABNORMALITY IN PREVIOUS PREGNANCY, CURRENTLY PREGNANT: Primary | ICD-10-CM

## 2025-06-26 DIAGNOSIS — Z34.80 PRENATAL CARE, SUBSEQUENT PREGNANCY, UNSPECIFIED TRIMESTER: ICD-10-CM

## 2025-06-26 PROBLEM — O36.8990 UMBILICAL CORD CYST DURING PREGNANCY, ANTEPARTUM: Status: RESOLVED | Noted: 2024-11-12 | Resolved: 2025-06-26

## 2025-06-26 PROBLEM — O28.0 ABNORMAL MATERNAL SERUM SCREENING TEST: Status: RESOLVED | Noted: 2024-11-20 | Resolved: 2025-06-26

## 2025-06-26 PROBLEM — R93.89 ABNORMAL FINDING ON ULTRASOUND: Status: RESOLVED | Noted: 2024-11-20 | Resolved: 2025-06-26

## 2025-06-26 LAB
ALBUMIN UR-MCNC: NEGATIVE MG/DL
APPEARANCE UR: ABNORMAL
BACTERIA #/AREA URNS HPF: ABNORMAL /HPF
BILIRUB UR QL STRIP: NEGATIVE
C TRACH DNA SPEC QL NAA+PROBE: NEGATIVE
COLOR UR AUTO: YELLOW
ERYTHROCYTE [DISTWIDTH] IN BLOOD BY AUTOMATED COUNT: 12.6 % (ref 10–15)
EST. AVERAGE GLUCOSE BLD GHB EST-MCNC: 94 MG/DL
GLUCOSE UR STRIP-MCNC: NEGATIVE MG/DL
HBA1C MFR BLD: 4.9 % (ref 0–5.6)
HCT VFR BLD AUTO: 44.1 % (ref 35–47)
HGB BLD-MCNC: 14.5 G/DL (ref 11.7–15.7)
HGB UR QL STRIP: NEGATIVE
HIV 1+2 AB+HIV1 P24 AG SERPL QL IA: NONREACTIVE
KETONES UR STRIP-MCNC: NEGATIVE MG/DL
LEUKOCYTE ESTERASE UR QL STRIP: ABNORMAL
MCH RBC QN AUTO: 28.7 PG (ref 26.5–33)
MCHC RBC AUTO-ENTMCNC: 32.9 G/DL (ref 31.5–36.5)
MCV RBC AUTO: 87 FL (ref 78–100)
N GONORRHOEA DNA SPEC QL NAA+PROBE: NEGATIVE
NITRATE UR QL: NEGATIVE
PH UR STRIP: 7 [PH] (ref 5–7)
PLATELET # BLD AUTO: 284 10E3/UL (ref 150–450)
RBC # BLD AUTO: 5.06 10E6/UL (ref 3.8–5.2)
RBC #/AREA URNS AUTO: ABNORMAL /HPF
SP GR UR STRIP: 1.02 (ref 1–1.03)
SPECIMEN TYPE: NORMAL
SPECIMEN TYPE: NORMAL
SQUAMOUS #/AREA URNS AUTO: ABNORMAL /LPF
UROBILINOGEN UR STRIP-ACNC: 0.2 E.U./DL
WBC # BLD AUTO: 7.7 10E3/UL (ref 4–11)
WBC #/AREA URNS AUTO: ABNORMAL /HPF

## 2025-06-26 PROCEDURE — 76817 TRANSVAGINAL US OBSTETRIC: CPT | Performed by: STUDENT IN AN ORGANIZED HEALTH CARE EDUCATION/TRAINING PROGRAM

## 2025-06-26 PROCEDURE — 76801 OB US < 14 WKS SINGLE FETUS: CPT | Performed by: STUDENT IN AN ORGANIZED HEALTH CARE EDUCATION/TRAINING PROGRAM

## 2025-06-26 NOTE — PROGRESS NOTES
Gloria is a 20 year old  @ 7 weeks here for new OB visit.  FOB and her mother are with her today.  Patient had a pregnancy last fall, NIPT testing was abnormal for trisomy 18 and comprehensive imaging showed several concerning abnormalities. Patient opted for termination of the pregnancy and believes this was done at [facility] (cannot recall exactly, but remembers it was in [place]).     Patient had declined amniocentesis with MFM and also post surgical genetics screening.     Early ultrasound for viability and confirming dates scheduled for this afternoon.    See OB questionnaire for pertinent components of HPI.    OBhx: Suspected Trisomy 18  Gyne: history of STD No STD history  Past Medical History:   Diagnosis Date    No pertinent past medical history      Past Surgical History:   Procedure Laterality Date    DILATION AND EVACUATION  2024    Trisomy 18     Patient Active Problem List   Diagnosis Code    Supervision of young primigravida, antepartum O09.619    History of fetal abnormality in previous pregnancy, currently pregnant O09.299       No Known Allergies  Current Outpatient Medications   Medication Sig Dispense Refill    Prenatal Vit-Fe Fumarate-FA (TRINATAL RX 1) 60-1 MG TABS Take 1 tablet by mouth.       No current facility-administered medications for this visit.      Review of Systems:     CONSTITUTIONAL:NEGATIVE for fever, chills, change in weight  INTEGUMENTARY/SKIN: NEGATIVE for worrisome rashes, moles or lesions  EYES: NEGATIVE for vision changes or irritation  ENT/MOUTH: NEGATIVE for ear, mouth and throat problems  RESP:NEGATIVE for significant cough or SOB  BREAST: NEGATIVE for masses, tenderness or discharge  CV: NEGATIVE for chest pain, palpitations or peripheral edema  GI: NEGATIVE for nausea, abdominal pain, heartburn, or change in bowel habits  :  Denies current vaginal bleeding, abnormal vaginal discharge  NEURO: NEGATIVE for weakness, dizziness or  "paresthesias  HEME/ALLERGY/IMMUNE: NEGATIVE for bleeding problems  PSYCHIATRIC: NEGATIVE for changes in mood or affect      Past Medical History of Father of Baby: No significant medical history  History Since Last Menstrual Period: No Problems    Physical Exam: /70 (BP Location: Right arm, Patient Position: Sitting, Cuff Size: Adult Regular)   Pulse 66   Ht 1.506 m (4' 11.29\")   Wt 43.5 kg (96 lb)   LMP 2025   SpO2 99%   BMI 19.20 kg/m    General: Well developed, well nourished female  Skin: Normal  Abdomen: Benign and No masses, organomegaly    Extremities: Normal  Neurological: Normal   Perineum: Intact   Vulva: Normal  Vagina: Normal mucosa, no discharge  Cervix: Nulliparous, closed    A/P 20 year old  at 7 weeks  Discussed physician coverage, tertiary support, diet, exercise, weight gain, schedule of visits, routine and indicated ultrasounds, and childbirth education.    Labs today:  CBC with platelets  ABORh Type and Screen  Rubella antibody  Treponema  Hepatitis C antibody  HIV antigen/antibody combo  Hepatitis B surface antigen  A1c  Urine Culture  Urine microscopic and macroscopic  Gonorrhea  Chlamydia    Continue taking prenatal vitamins.    Consider initiation of low dose (81mg) aspirin for risk reduction of hypertensive disorders of pregnancy (HDP). We reviewed this should be initiated  between 12 and 28 weeks gestation (optimally before 16 weeks) and continued daily until delivery. In women with a prior history of preeclampsia or an increased risk of  developing preeclampsia due to other risk factors (multifetal gestation, renal disease, autoimmune disease, type 1 or type 2 diabetes, and chronic hypertension) low-dose  aspirin may reduce the recurrence risk by up to 25%.     Given prior history, desires to meet with M early for first trimester screening. Planning NIPT after 10 weeks.     Jyoti BURRIS CNP               "

## 2025-06-27 ENCOUNTER — RESULTS FOLLOW-UP (OUTPATIENT)
Dept: OBGYN | Facility: CLINIC | Age: 20
End: 2025-06-27

## 2025-06-27 DIAGNOSIS — N30.00 ACUTE CYSTITIS WITHOUT HEMATURIA: Primary | ICD-10-CM

## 2025-06-29 LAB
BACTERIA UR CULT: ABNORMAL
BACTERIA UR CULT: ABNORMAL

## 2025-06-30 RX ORDER — NITROFURANTOIN 25; 75 MG/1; MG/1
100 CAPSULE ORAL 2 TIMES DAILY
Qty: 14 CAPSULE | Refills: 0 | Status: SHIPPED | OUTPATIENT
Start: 2025-06-30 | End: 2025-07-28

## 2025-07-10 ENCOUNTER — MYC MEDICAL ADVICE (OUTPATIENT)
Dept: OBGYN | Facility: CLINIC | Age: 20
End: 2025-07-10
Payer: MEDICAID

## 2025-07-30 ENCOUNTER — PRE VISIT (OUTPATIENT)
Facility: CLINIC | Age: 20
End: 2025-07-30
Payer: MEDICAID

## 2025-08-04 ENCOUNTER — ANCILLARY PROCEDURE (OUTPATIENT)
Facility: CLINIC | Age: 20
End: 2025-08-04
Attending: OBSTETRICS & GYNECOLOGY
Payer: MEDICAID

## 2025-08-04 ENCOUNTER — OFFICE VISIT (OUTPATIENT)
Facility: CLINIC | Age: 20
End: 2025-08-04
Attending: OBSTETRICS & GYNECOLOGY
Payer: MEDICAID

## 2025-08-04 DIAGNOSIS — O09.299 HISTORY OF FETAL ABNORMALITY IN PREVIOUS PREGNANCY, CURRENTLY PREGNANT: ICD-10-CM

## 2025-08-04 DIAGNOSIS — O09.299 HISTORY OF FETAL ABNORMALITY IN PREVIOUS PREGNANCY, CURRENTLY PREGNANT: Primary | ICD-10-CM

## 2025-08-04 DIAGNOSIS — O35.10X0 FAMILY HISTORIC RISK OF CHROMOSOMAL ABNORMALITY IN FETUS, ANTEPARTUM, SINGLE OR UNSPECIFIED FETUS: Primary | ICD-10-CM

## 2025-08-04 PROCEDURE — 76801 OB US < 14 WKS SINGLE FETUS: CPT | Performed by: OBSTETRICS & GYNECOLOGY

## 2025-08-04 PROCEDURE — 99207 PR NO CHARGE LOS: CPT | Performed by: OBSTETRICS & GYNECOLOGY
